# Patient Record
Sex: MALE | Race: BLACK OR AFRICAN AMERICAN | NOT HISPANIC OR LATINO | Employment: OTHER | URBAN - METROPOLITAN AREA
[De-identification: names, ages, dates, MRNs, and addresses within clinical notes are randomized per-mention and may not be internally consistent; named-entity substitution may affect disease eponyms.]

---

## 2017-10-09 ENCOUNTER — ALLSCRIPTS OFFICE VISIT (OUTPATIENT)
Dept: OTHER | Facility: OTHER | Age: 57
End: 2017-10-09

## 2017-10-28 NOTE — PROGRESS NOTES
Chief Complaint  CC: Patient here for dark patches on stomach area  History of Present Illness  70-year-old male presents secondary to concerns regarding pigmented lesions noted on his lower abdomen that his primary care physician told him to have check  Patient not sure how long lesions present  Patient also reports a history of dyshidrotic eczema which he developed large blisters on both his hands and feet different times  Patient never was given a topical treatment for this except over-the-counter preparations      Assessment  Assessed    1  Multiple benign nevi without atypia (216 9) (D22 9)   2  Screening for skin condition (V82 0) (Z13 89)   3  Eczematous dermatitis (692 9) (L30 9)    Past Medical History  Problems    1  History of malignant neoplasm of prostate (V10 46) (Z85 46)    The past medical history was reviewed  Surgical History  Problems    1  History of Oral Surgery Tooth Extraction    Surgical History reviewed      Family History  Mother    1  Family history of diabetes mellitus (V18 0) (Z83 3)   2  Family history of hypertension (V17 49) (Z82 49)  Father    3  Family history of diabetes mellitus (V18 0) (Z83 3)   4  Family history of malignant neoplasm of prostate (V16 42) (Z80 42)   5  Family history of Parkinsonism (V17 2) (Z82 0)  Family History Reviewed- Derm:   Family History was reviewed      Social History  Problems    · Social alcohol use (Z78 9)   · Tobacco non-user (V49 89) (Z78 9)  The social history was reviewed      Current Meds   1  Lidocaine 5 % External Patch; Therapy: (Recorded:[id number]) to Recorded   2  Robaxin-750 750 MG Oral Tablet; Therapy: (Recorded:09Oct2017) to Recorded    Review of Systems   Constitutional: Denies constitutional symptoms  Eyes: Denies eye symptoms  ENT:  denies ear symptoms, nasal symptoms, mouth or throat symptoms  Cardiovascular: Denies cardiovascular symptoms  Respiratory: Denies respiratory symptoms    Gastrointestinal: Denies gastrointestinal symptoms  Musculoskeletal: Denies musculoskeletal symptoms  Integumentary: Denies symptoms other than stated above  Neurological: Denies neurologic symptoms  Psychiatric: Denies psychiatric symptoms  Endocrine: Denies endocrine symptoms  Hematologic/Lymphatic: Denies hematologic symptoms  Allergies  Medication    1  No Known Drug Allergies  Non-Medication    2  No Known Environmental Allergies   3  No Known Food Allergies    Physical Exam   Constitutional  General appearance: Appears healthy and well developed  Lymphatic  No visible disturbance  Musculoskeletal  Digits and nails: No clubbing, cyanosis or edema  Cutaneous and nail exam normal    Skin  Scalp skin texture and hair distribution: Normal skin texture on scalp, normal hair distribution  Head: Normal turgor, no rashes, no lesions  Neck: Normal turgor, no rashes, no lesions  Chest: Normal turgor, no rashes, no lesions  Abdomen: Normal turgor, no rashes, no lesions  Back: Normal turgor, no rashes, no lesions  Right upper extremity: Normal turgor, no rashes, no lesions  Left upper extremity: Normal turgor, no rashes, no lesions  Right lower extremity: Normal turgor, no rashes, no lesions  Left lower extremity: Normal turgor, no rashes, no lesions  Neuro/Psych  Alert and oriented x 3  Displays comfort and cooperation during encounterl  Affect is normal    Finding Erythema slight scaling noted on the feet nothing much noted on the hands at this time pigmented macules all treatment regular shape and color on the abdomen nothing markedly atypical nevus start skin individual       Plan  Eczematous dermatitis    · Fluocinonide 0 05 % External Cream; APPLY SPARINGLY TO AFFECTED AREA(S)TWICE DAILY   · Follow-up PRN Evaluation and Treatment  Follow-up  Status: Complete  Done:09Oct2017    Discussion/Summary   Assessment #1: Nevi    Care Plan:  Reviewed the concept of ABCD and maranda alexander patient advised that patient patients with dark skin is a unusual phenomenon to develop any type of melanoma except for areas where the skin is less pigmented such as on the palms and soles  Assessment #2: Hand eczema  Care Plan: To me I if this is a contact related process we'll go ahead and treat with topical steroids follow-up if Flaring noted and consider patch testing if this process continues  Assessment #3: Screening for dermatologic disorders  Care Plan:  Nothing else of concern noted on exam follow-up as needed        Signatures   Electronically signed by : HAYDEN Mirza ; Oct  9 2017 11:32AM EST                       (Author)

## 2018-01-02 ENCOUNTER — GENERIC CONVERSION - ENCOUNTER (OUTPATIENT)
Dept: OTHER | Facility: OTHER | Age: 58
End: 2018-01-02

## 2019-06-25 ENCOUNTER — TELEPHONE (OUTPATIENT)
Dept: UROLOGY | Facility: AMBULATORY SURGERY CENTER | Age: 59
End: 2019-06-25

## 2019-06-25 NOTE — TELEPHONE ENCOUNTER
[facility] which states the patient has a history of prostate cancer and would need a repeat Biopsy and MRI  The patient is a new patient  Called patient and left a message to call office back to schedule and to find out which location he wants to go to

## 2019-08-02 NOTE — TELEPHONE ENCOUNTER
2nd attempt made to patient to call our office back to schedule  Letter sent   Records sent to central fax

## 2019-08-30 ENCOUNTER — TELEPHONE (OUTPATIENT)
Dept: UROLOGY | Facility: MEDICAL CENTER | Age: 59
End: 2019-08-30

## 2019-08-30 NOTE — TELEPHONE ENCOUNTER
Patient of Dr Jair Gutierrez seen in [facility]  Calling to cancel biopsy on 9/3 due to a family emergency  Next available spot I could find was in November  Should he be scheduled sooner?     He is on a plane and will be calling back once he lands

## 2019-08-30 NOTE — TELEPHONE ENCOUNTER
Patient can be seen for new patient appointment with and [facility] and they can evaluate to see if he needs a prostate biopsy and if so that can be scheduled with Dr Charles Adame has available appointments on 9/13/19

## 2019-08-30 NOTE — TELEPHONE ENCOUNTER
Going over the records I am finding that this patient was never scheduled for a biopsy, it was for a new patient appointment  This was a referral from the [facility] [address] [facility] for a jump in PSA 4 43 in August 2018 to 5 86 in December 2018  There is a consultation report from [facility] [place] [address] where patient was diagnosed with prostate cancer  They are requesting patient follows up closer to home for possible MRI/prostate biopsy  I cannot find another new patient appointment with Dr Madiha Sanchez in [facility] until 11/26/19  Should patient be seen sooner than that? If so please help to find sooner appointment  Thank you

## 2019-09-09 NOTE — TELEPHONE ENCOUNTER
PT is scheduled for 10/18/19 with Dr Yesica Peres              Notes: Rising PSA   Rescheduled:   9/9/2019 10:28 AM   By:   Farzaneh Taylor

## 2019-10-24 ENCOUNTER — OFFICE VISIT (OUTPATIENT)
Dept: DERMATOLOGY | Facility: CLINIC | Age: 59
End: 2019-10-24
Payer: COMMERCIAL

## 2019-10-24 DIAGNOSIS — L72.11 PILAR CYST: Primary | ICD-10-CM

## 2019-10-24 DIAGNOSIS — Z13.89 SCREENING FOR SKIN CONDITION: ICD-10-CM

## 2019-10-24 DIAGNOSIS — L20.84 INTRINSIC ATOPIC DERMATITIS: ICD-10-CM

## 2019-10-24 PROCEDURE — 99213 OFFICE O/P EST LOW 20 MIN: CPT | Performed by: DERMATOLOGY

## 2019-10-24 RX ORDER — IBUPROFEN 200 MG
TABLET ORAL EVERY 6 HOURS PRN
COMMUNITY
End: 2020-02-21 | Stop reason: HOSPADM

## 2019-10-24 NOTE — PATIENT INSTRUCTIONS
Pilar cyst advised patient that this is from hair follicles that ballooned out and forms this type of growth  No treatment indicated unless patient so desires or if lesion enlarges or changes    Hand dermatitis will refill his the topical steroid we had given to him previously  Screening for Dermatologic Disorders: Nothing else of concern noted on complete exam follow up in 1 year

## 2019-10-24 NOTE — PROGRESS NOTES
[facility] DERMATOLOGY  [address] [place] [address]  [id number]  [phone]     MRN: [id number] : 1960  Encounter: [id number]  Patient Information: Letha Willis  Chief complaint:  Cyst on scalp    History of present illness:  82-year-old male who had not seen for several years patient with a history of hand dermatitis has intermittently used different creams on at not active at this point but needs refill in addition patient also has a cyst present on the scalp that has been recently inflamed now settle down to being smaller than was previously but still present no other concerns noted  No past medical history on file  No past surgical history on file  Social History   Social History     Substance and Sexual Activity   Alcohol Use Not on file     Social History     Substance and Sexual Activity   Drug Use Not on file     Social History     Tobacco Use   Smoking Status Never Smoker   Smokeless Tobacco Never Used     No family history on file  Meds/Allergies   No Known Allergies    Meds:  Prior to Admission medications    Medication Sig Start Date End Date Taking?  Authorizing Provider   ibuprofen (MOTRIN) 200 mg tablet Take by mouth every 6 (six) hours as needed for mild pain   Yes Historical Provider, MD       Subjective:     Review of Systems:    General: negative for - chills, fatigue, fever,  weight gain or weight loss  Psychological: negative for - anxiety, behavioral disorder, concentration difficulties, decreased libido, depression, irritability, memory difficulties, mood swings, sleep disturbances or suicidal ideation  ENT: negative for - hearing difficulties , nasal congestion, nasal discharge, oral lesions, sinus pain, sneezing, sore throat  Allergy and Immunology: negative for - hives, insect bite sensitivity,  Hematological and Lymphatic: negative for - bleeding problems, blood clots,bruising, swollen lymph nodes  Endocrine: negative for - hair pattern changes, hot flashes, malaise/lethargy, mood swings, palpitations, polydipsia/polyuria, skin changes, temperature intolerance or unexpected weight change  Respiratory: negative for - cough, hemoptysis, orthopnea, shortness of breath, or wheezing  Cardiovascular: negative for - chest pain, dyspnea on exertion, edema,  Gastrointestinal: negative for - abdominal pain, nausea/vomiting  Genito-Urinary: negative for - dysuria, incontinence, irregular/heavy menses or urinary frequency/urgency  Musculoskeletal: negative for - gait disturbance, joint pain, joint stiffness, joint swelling, muscle pain, muscular weakness  Dermatological:  As in HPI  Neurological: negative for confusion, dizziness, headaches, impaired coordination/balance, memory loss, numbness/tingling, seizures, speech problems, tremors or weakness       Objective: There were no vitals taken for this visit  Physical Exam:    General Appearance:    Alert, cooperative, no distress   Head:    Normocephalic, without obvious abnormality, atraumatic           Skin:   A full skin exam was performed including scalp, head scalp, eyes, ears, nose, lips, neck, chest, axilla, abdomen, back, buttocks, bilateral upper extremities, bilateral lower extremities, hands, feet, fingers, toes, fingernails, and toenails cystic nodule noted on the posterior scalp measures about 15 mm size a no active hand dermatitis at this point nothing else of concern noted     Assessment:     1  Pilar cyst     2  Screening for skin condition     3  Intrinsic atopic dermatitis           Plan:   Pilar cyst advised patient that this is from hair follicles that ballooned out and forms this type of growth  No treatment indicated unless patient so desires or if lesion enlarges or changes    Hand dermatitis will refill his the topical steroid we had given to him previously  Screening for Dermatologic Disorders: Nothing else of concern noted on complete exam follow up in 1 year Roxanne Forbes MD  10/24/2019,10:13 AM    Portions of the record may have been created with voice recognition software   Occasional wrong word or "sound a like" substitutions may have occurred due to the inherent limitations of voice recognition software   Read the chart carefully and recognize, using context, where substitutions have occurred

## 2019-10-31 ENCOUNTER — OFFICE VISIT (OUTPATIENT)
Dept: UROLOGY | Facility: CLINIC | Age: 59
End: 2019-10-31
Payer: COMMERCIAL

## 2019-10-31 VITALS
HEIGHT: 71 IN | WEIGHT: 206.02 LBS | BODY MASS INDEX: 28.84 KG/M2 | DIASTOLIC BLOOD PRESSURE: 84 MMHG | HEART RATE: 58 BPM | SYSTOLIC BLOOD PRESSURE: 100 MMHG

## 2019-10-31 DIAGNOSIS — C61 PROSTATE CANCER (HCC): ICD-10-CM

## 2019-10-31 DIAGNOSIS — Z80.42 FAMILY HISTORY OF PROSTATE CANCER IN FATHER: ICD-10-CM

## 2019-10-31 DIAGNOSIS — R35.1 NOCTURIA: Primary | ICD-10-CM

## 2019-10-31 DIAGNOSIS — N35.919 STRICTURE OF MALE URETHRA, UNSPECIFIED STRICTURE TYPE: ICD-10-CM

## 2019-10-31 LAB
SL AMB  POCT GLUCOSE, UA: NORMAL
SL AMB LEUKOCYTE ESTERASE,UA: NORMAL
SL AMB POCT BLOOD,UA: NORMAL
SL AMB POCT CLARITY,UA: CLEAR
SL AMB POCT COLOR,UA: YELLOW
SL AMB POCT KETONES,UA: NORMAL
SL AMB POCT NITRITE,UA: NORMAL
SL AMB POCT PH,UA: 5
SL AMB POCT SPECIFIC GRAVITY,UA: 1
SL AMB POCT URINE PROTEIN: NORMAL

## 2019-10-31 PROCEDURE — 81002 URINALYSIS NONAUTO W/O SCOPE: CPT | Performed by: UROLOGY

## 2019-10-31 PROCEDURE — 99204 OFFICE O/P NEW MOD 45 MIN: CPT | Performed by: UROLOGY

## 2019-10-31 NOTE — PROGRESS NOTES
UROLOGY NEW CONSULT NOTE     CHIEF COMPLAINT   Noemí Herrera is a 64 y o  male with a complaint of   Chief Complaint   Patient presents with    Elevated PSA       History of Present Illness:     64 y o  male with a family history of prostate cancer in his father and grandfather  Patient a mildly elevated PSA in 2015 and underwent a prostate biopsy through the [facility]  This demonstrated 2 cores Vinicius 6 prostate cancer in the left side of the prostate  Patient reports he underwent a repeat biopsy in 2016 as part of his surveillance protocol  I do not have pathologic results of this  There is an office note the raise the question of progression with a [place] 3+4=7 prostate cancer  Patient was then kept on active surveillance  We have noted some rising his PSA from 3 2 in May of 2000 14-4 06 in May of 2015 to now 4 46  He presents today as a transfer of care to discuss next steps in his surveillance protocol  He has noted some itching and irritation with urine and spraying of his urinary stream     5/30/19 PSA 4 46 %Free 13 45    Past Medical History:   History reviewed  No pertinent past medical history  PAST SURGICAL HISTORY:   History reviewed  No pertinent surgical history  CURRENT MEDICATIONS:     Current Outpatient Medications   Medication Sig Dispense Refill    fluocinonide-emollient (LIDEX-E) 0 05 % cream Apply topically 2 (two) times a day 30 g 3    ibuprofen (MOTRIN) 200 mg tablet Take by mouth every 6 (six) hours as needed for mild pain       No current facility-administered medications for this visit          ALLERGIES:   No Known Allergies    SOCIAL HISTORY:     Social History     Socioeconomic History    Marital status: /Civil Union     Spouse name: None    Number of children: None    Years of education: None    Highest education level: None   Occupational History    None   Social Needs    Financial resource strain: None    Food insecurity:     Worry: None     Inability: None    Transportation needs:     Medical: None     Non-medical: None   Tobacco Use    Smoking status: Never Smoker    Smokeless tobacco: Never Used   Substance and Sexual Activity    Alcohol use: Never     Frequency: Never    Drug use: Never    Sexual activity: None   Lifestyle    Physical activity:     Days per week: None     Minutes per session: None    Stress: None   Relationships    Social connections:     Talks on phone: None     Gets together: None     Attends Alevism service: None     Active member of club or organization: None     Attends meetings of clubs or organizations: None     Relationship status: None    Intimate partner violence:     Fear of current or ex partner: None     Emotionally abused: None     Physically abused: None     Forced sexual activity: None   Other Topics Concern    None   Social History Narrative    None       SOCIAL HISTORY:     Family History   Problem Relation Age of Onset    Prostate cancer Father         2009    Diabetes Mother     Hypertension Mother        REVIEW OF SYSTEMS:     Review of Systems   Constitutional: Negative  Respiratory: Negative  Cardiovascular: Negative  Gastrointestinal: Negative  Genitourinary: Positive for dysuria, frequency and urgency  Musculoskeletal: Negative  Skin: Negative  Psychiatric/Behavioral: Negative  PHYSICAL EXAM:     /84   Pulse 58   Ht 5' 11" (1 803 m)   Wt 93 5 kg (206 lb 0 3 oz)   BMI 28 73 kg/m²     General:  Healthy appearing    male in no acute distress  They have a normal affect  There is not appear to be any gross neurologic defects or abnormalities  HEENT:  Normocephalic, atraumatic  Neck is supple without any palpable lymphadenopathy  Cardiovascular:  Patient has normal palpable distal radial pulses  There is no significant peripheral edema  No JVD is noted  Respiratory:  Patient has unlabored respirations    There is no audible wheeze or rhonchi  Abdomen:  Abdomen is  without surgical scars  Abdomen is soft and nontender  There is no tympany  Inguinal and umbilical hernia are not appreciated  Genitourinary:  Circumcised phallus, meatus is orthotopic but demonstrates some signs of meatal stenosis, testicles smooth and descended, prostate is mildly enlarged at 40 g no nodules or induration palpated    Musculoskeletal:  Patient does not have significant CVA tenderness in the  flank with palpation or percussion  They full range of motion in all 4 extremities  Strength in all 4 extremities appears congruent  Patient is able to ambulate without assistance or difficulty  Dermatologic:  Patient has no skin abnormalities or rashes  LABS:     CBC: No results found for: WBC, HGB, HCT, MCV, PLT    BMP: No results found for: GLUCOSE, CALCIUM, NA, K, CO2, CL, BUN, CREATININE    ASSESSMENT:     64 y o  male with [place] 6 prostate cancer in 2/12 cores diagnosed in 2015 with questionable description of potential progression of 3+4=7 prostate cancer without formal pathology available, managed with active surveillance    PLAN:     Given the office note that raises concern for progression of prostate cancer in 2016 as well as the patient's slightly rising PSA and the fact that he has year for of active surveillance, I have recommended that we move forward with multiparametric MRI of the prostate  If this is positive, the patient will be a great candidate for a fusion style biopsy  If the MRI is negative, would likely recommend transperineal biopsy in the operating room to reduce the risk of infection given the patient's prior biopsies  Patient understands and is very agreeable to this plan

## 2019-11-12 ENCOUNTER — TELEPHONE (OUTPATIENT)
Dept: UROLOGY | Facility: CLINIC | Age: 59
End: 2019-11-12

## 2019-11-12 NOTE — TELEPHONE ENCOUNTER
On bumped list - I called pt and left detailed voicemail that appt has been reschedule to 1/2/20 at 9am  If this date and time does not work , please call back call center to reschedule

## 2019-11-25 ENCOUNTER — TELEPHONE (OUTPATIENT)
Dept: UROLOGY | Facility: AMBULATORY SURGERY CENTER | Age: 59
End: 2019-11-25

## 2019-11-25 NOTE — TELEPHONE ENCOUNTER
This patient is scheduled for an MRI of the prostate on 12/3/2019 and the [facility] referral we have on file is   Please obtain new one his follow up is after the imaging as well    Thanks  Fabricio Bonner

## 2019-12-11 NOTE — TELEPHONE ENCOUNTER
Patient called in regard to need to cancel the MRI fusion Biopsy  Reported a family emergency  Please be advise    He can be reached at [phone] to reschedule  Thank you

## 2019-12-12 NOTE — TELEPHONE ENCOUNTER
Called and left message for patient that he needs to call central scheduling to cancel and reschedule MRI  Central scheduling phone number was left in the message

## 2020-01-07 ENCOUNTER — HOSPITAL ENCOUNTER (OUTPATIENT)
Dept: RADIOLOGY | Age: 60
Discharge: HOME | End: 2020-01-07
Payer: OTHER GOVERNMENT

## 2020-01-07 DIAGNOSIS — C61 PROSTATE CANCER (HCC): ICD-10-CM

## 2020-01-07 PROCEDURE — A9585 GADOBUTROL INJECTION: HCPCS | Performed by: UROLOGY

## 2020-01-07 PROCEDURE — 76377 3D RENDER W/INTRP POSTPROCES: CPT

## 2020-01-07 PROCEDURE — 72197 MRI PELVIS W/O & W/DYE: CPT

## 2020-01-07 RX ADMIN — GADOBUTROL 9 ML: 604.72 INJECTION INTRAVENOUS at 11:31

## 2020-01-09 ENCOUNTER — OFFICE VISIT (OUTPATIENT)
Dept: UROLOGY | Facility: CLINIC | Age: 60
End: 2020-01-09
Payer: COMMERCIAL

## 2020-01-09 VITALS
HEART RATE: 58 BPM | WEIGHT: 206 LBS | DIASTOLIC BLOOD PRESSURE: 84 MMHG | HEIGHT: 71 IN | BODY MASS INDEX: 28.84 KG/M2 | SYSTOLIC BLOOD PRESSURE: 116 MMHG

## 2020-01-09 DIAGNOSIS — C61 PROSTATE CANCER (HCC): Primary | ICD-10-CM

## 2020-01-09 PROCEDURE — 99214 OFFICE O/P EST MOD 30 MIN: CPT | Performed by: UROLOGY

## 2020-01-09 RX ORDER — MELOXICAM 15 MG/1
15 TABLET ORAL
COMMUNITY
Start: 2019-12-04 | End: 2020-02-21 | Stop reason: HOSPADM

## 2020-01-09 NOTE — PROGRESS NOTES
UROLOGY NEW CONSULT NOTE     CHIEF COMPLAINT   Waqar Carrillo is a 64 y o  male with a complaint of   Chief Complaint   Patient presents with    Prostate Cancer       History of Present Illness:     64 y o  male with a family history of prostate cancer in his father and grandfather  Patient a mildly elevated PSA in 2015 and underwent a prostate biopsy through the [employer]  This demonstrated 2 cores [place] 6 prostate cancer in the left side of the prostate  Patient reports he underwent a repeat biopsy in 2016 as part of his surveillance protocol  I do not have pathologic results of this  There is an office note the raise the question of progression with a Vinicius 3+4=7 prostate cancer  Patient was then kept on active surveillance  We have noted some rising his PSA from 3 2 in May of 2000 14-4 06 in May of 2015 to now 4 46  He presents today as a transfer of care to discuss next steps in his surveillance protocol  He has noted some itching and irritation with urine and spraying of his urinary stream     5/30/19 PSA 4 46 %Free 13 45      Patient returns after October visit  He underwent his multiparametric MRI  This shows a PI-RADS 1 scoring  Patient returns to discuss findings  Unfortunately, his brother passed away in December  Unclear underlying condition  No direct prostate cancer diagnosis in him  AUA SS 16 QoL 1, HELENA 24    Past Medical History:   History reviewed  No pertinent past medical history  PAST SURGICAL HISTORY:   History reviewed  No pertinent surgical history  CURRENT MEDICATIONS:     Current Outpatient Medications   Medication Sig Dispense Refill    fluocinonide-emollient (LIDEX-E) 0 05 % cream Apply topically 2 (two) times a day 30 g 3    ibuprofen (MOTRIN) 200 mg tablet Take by mouth every 6 (six) hours as needed for mild pain      meloxicam (MOBIC) 15 mg tablet        No current facility-administered medications for this visit          ALLERGIES:   No Known Allergies    SOCIAL HISTORY:     Social History     Socioeconomic History    Marital status: /Civil Union     Spouse name: None    Number of children: None    Years of education: None    Highest education level: None   Occupational History    None   Social Needs    Financial resource strain: None    Food insecurity:     Worry: None     Inability: None    Transportation needs:     Medical: None     Non-medical: None   Tobacco Use    Smoking status: Never Smoker    Smokeless tobacco: Never Used   Substance and Sexual Activity    Alcohol use: Never     Frequency: Never    Drug use: Never    Sexual activity: None   Lifestyle    Physical activity:     Days per week: None     Minutes per session: None    Stress: None   Relationships    Social connections:     Talks on phone: None     Gets together: None     Attends Yarsanism service: None     Active member of club or organization: None     Attends meetings of clubs or organizations: None     Relationship status: None    Intimate partner violence:     Fear of current or ex partner: None     Emotionally abused: None     Physically abused: None     Forced sexual activity: None   Other Topics Concern    None   Social History Narrative    None       SOCIAL HISTORY:     Family History   Problem Relation Age of Onset    Prostate cancer Father         2009    Diabetes Mother     Hypertension Mother        REVIEW OF SYSTEMS:     Review of Systems   Constitutional: Negative  Respiratory: Negative  Cardiovascular: Negative  Gastrointestinal: Negative  Genitourinary: Positive for dysuria, frequency and urgency  Musculoskeletal: Negative  Skin: Negative  Psychiatric/Behavioral: Negative            PHYSICAL EXAM:     /84 (BP Location: Left arm, Patient Position: Sitting, Cuff Size: Standard)   Pulse 58   Ht 5' 11" (1 803 m)   Wt 93 4 kg (206 lb)   BMI 28 73 kg/m²     General:  Healthy appearing    male in no acute distress  They have a normal affect  There is not appear to be any gross neurologic defects or abnormalities  HEENT:  Normocephalic, atraumatic  Neck is supple without any palpable lymphadenopathy  Cardiovascular:  Patient has normal palpable distal radial pulses  There is no significant peripheral edema  No JVD is noted  Respiratory:  Patient has unlabored respirations  There is no audible wheeze or rhonchi  Abdomen:  Abdomen is  without surgical scars  Abdomen is soft and nontender  There is no tympany  Inguinal and umbilical hernia are not appreciated  Musculoskeletal:  Patient does not have significant CVA tenderness in the  flank with palpation or percussion  They full range of motion in all 4 extremities  Strength in all 4 extremities appears congruent  Patient is able to ambulate without assistance or difficulty  Dermatologic:  Patient has no skin abnormalities or rashes  LABS:     CBC: No results found for: WBC, HGB, HCT, MCV, PLT    BMP: No results found for: GLUCOSE, CALCIUM, NA, K, CO2, CL, BUN, CREATININE      IMAGIN/7/20  MULTIPARAMETRIC MRI OF THE PROSTATE WITH AND WITHOUT CONTRAST-WITH 3-D POSTPROCESSING      INDICATION:   C61: Malignant neoplasm of prostate      Family history of prostate cancer in his father and grandfather  Patient a mildly elevated PSA in  and underwent a prostate biopsy through the [facility]  This demonstrated 2 cores [place] 6 prostate cancer in the left side of the prostate  Patient   reports he underwent a repeat biopsy in 2016 as part of his surveillance protocol  Pathologic results are not available  Patient was then kept on active surveillance  We have noted some rising his PSA from 3 2 in May of 2014 to 4 06 in May of 2015 to   4 46 in May 2019        COMPARISON: None      PSA LEVEL: 4 46 ng/ml  2019   PRIOR BIOPSY DATE: As above    BIOPSY RESULTS: As above      TECHNIQUE: The following pulse sequences were obtained:  Small field-of-view axial T1-weighted and multiplanar T2-weighted images; DWI axial and ADC map; large field of view axial T2 weighted images; T1w in-phase and opposed-phase axials of entire pelvis   and dynamic 3D T1w axial before and during IV contrast injection         CONTRAST:  Gadobutrol (Gadavist) 9 mL of gadobutrol injection (MULTI-DOSE) ml      TECHNICAL LIMITATIONS: None         FINDINGS:     PROSTATE:     Size (AP x TRV x CC): 3 7 x 4 6 x 4 1 cm = 37 mL  Post-biopsy hemorrhage:  None  Central gland enlargement (BPH): Mild      Focal lesions - No dominant lesion  Findings of BPH with round, completely encapsulated nodule (s)  PI-RADSv2 1 Category 1 -  Very low (clinically significant cancer highly unlikely)      Seminal vesicle invasion: Not present      URINARY BLADDER: Unremarkable      LYMPH NODES: No pelvic lymphadenopathy      BONES: No suspicious osseous lesion            Note: Clinically significant cancer is defined on pathology/histology as Vinicius score greater than or equal to 7, and/or volume of greater than or equal to 0 5 mL, and/or extraprostatic extension      IMPRESSION:     1  PI-RADSv2 1 Category 1 - Very low (clinically significant cancer is highly unlikely to be present)  Mild BPH      2  No extraprostatic tumor, seminal vesicle invasion, pelvic lymphadenopathy, or pelvic osseous metastatic disease      3  Calculated prostate volume of 37 mL      Prostate gland boundaries were segmented using 3D advanced post-processing on an independent iAmplify system workstation with active physician participation  ASSESSMENT:     64 y o  male with Vinicius 6 prostate cancer in 2/12 cores diagnosed in 2015 with questionable description of potential progression of 3+4=7 prostate cancer without formal pathology available, managed with active surveillance    PLAN:     At this point time, patient has no findings of lesion on the multiparametric MRI    I do feel strongly he will require and updated biopsy especially given the question of [place] 7 cancer  I have recommended that we approached in a transperineal approach  This will be done under anesthesia in the operating room  Procedure risks and benefits discussed with the patient today and he is very motivated to move forward  Transperineal prostate biopsy will be scheduled

## 2020-01-20 ENCOUNTER — PROCEDURE VISIT (OUTPATIENT)
Dept: DERMATOLOGY | Facility: CLINIC | Age: 60
End: 2020-01-20
Payer: COMMERCIAL

## 2020-01-20 DIAGNOSIS — L72.11 PILAR CYST: Primary | ICD-10-CM

## 2020-01-20 PROCEDURE — 99212 OFFICE O/P EST SF 10 MIN: CPT | Performed by: DERMATOLOGY

## 2020-01-20 NOTE — PATIENT INSTRUCTIONS
Since lesion has pretty much disappeared this time would not go ahead and advise further at this point unless it appears to be recurring

## 2020-01-20 NOTE — PROGRESS NOTES
[facility] DERMATOLOGY  [address] [place] [id number]  [phone]  [phone]     MRN: [id number] : 1960  Encounter: [id number]  Patient Information: Eartha Jeans    Subjective:     70-year-old male who rescheduled for excision of Pilar cyst on the scalp notes that the lesion became somewhat more inflamed was this charge some material that he squeezed the area and emptied out most of the lesion     Objective: There were no vitals taken for this visit  Physical Exam:    General Appearance:    Alert, cooperative, no distress   Skin:   No distinct cyst noted at this time     Assessment:     1  Pilar cyst           Plan:   Since lesion has pretty much disappeared this time would not go ahead and advise further at this point unless it appears to be recurring      Prior to Admission medications    Medication Sig Start Date End Date Taking? Authorizing Provider   fluocinonide-emollient (LIDEX-E) 0 05 % cream Apply topically 2 (two) times a day 10/24/19  Yes Claudette Shade, MD   ibuprofen (MOTRIN) 200 mg tablet Take by mouth every 6 (six) hours as needed for mild pain   Yes Historical Provider, MD   meloxicam (MOBIC) 15 mg tablet  19  Yes Historical Provider, MD     No Known Allergies    Claudette Shade, MD  2020,2:13 PM    Portions of the record may have been created with voice recognition software   Occasional wrong word or "sound a like" substitutions may have occurred due to the inherent limitations of voice recognition software   Read the chart carefully and recognize, using context, where substitutions have occurred

## 2020-01-21 NOTE — PRE-PROCEDURE INSTRUCTIONS
Pre-Surgery Instructions:   Medication Instructions    fluocinonide-emollient (LIDEX-E) 0 05 % cream Instructed patient per Anesthesia Guidelines   ibuprofen (MOTRIN) 200 mg tablet Instructed patient per Anesthesia Guidelines   meloxicam (MOBIC) 15 mg tablet Instructed patient per Anesthesia Guidelines      Preop,medications and showering instructions using an antibacterial soap reviewed - Instructed to follow DR Doan's bowel prep instructions

## 2020-01-23 ENCOUNTER — TELEPHONE (OUTPATIENT)
Dept: UROLOGY | Facility: AMBULATORY SURGERY CENTER | Age: 60
End: 2020-01-23

## 2020-01-23 NOTE — TELEPHONE ENCOUNTER
Faxed records to [facility] (fax# [phone])  Patient is having a procedure on 2-21-20 and his records from that procedure will be faxed to [facility] at that time as well

## 2020-02-10 ENCOUNTER — ANESTHESIA EVENT (OUTPATIENT)
Dept: PERIOP | Facility: AMBULARY SURGERY CENTER | Age: 60
End: 2020-02-10
Payer: OTHER GOVERNMENT

## 2020-02-20 ENCOUNTER — TELEPHONE (OUTPATIENT)
Dept: UROLOGY | Facility: MEDICAL CENTER | Age: 60
End: 2020-02-20

## 2020-02-20 NOTE — TELEPHONE ENCOUNTER
I sent patient an email that the hospital will call between 3-6 this afternoon with his arrival time

## 2020-02-20 NOTE — TELEPHONE ENCOUNTER
This is a patient of Dr Carmen Rodgers in [facility]     Patient has a question about surgery time for tomorrow and location  I did transfer him to your [facility]

## 2020-02-21 ENCOUNTER — HOSPITAL ENCOUNTER (OUTPATIENT)
Facility: AMBULARY SURGERY CENTER | Age: 60
Setting detail: OUTPATIENT SURGERY
Discharge: HOME | End: 2020-02-21
Attending: UROLOGY | Admitting: UROLOGY
Payer: OTHER GOVERNMENT

## 2020-02-21 ENCOUNTER — ANESTHESIA (OUTPATIENT)
Dept: PERIOP | Facility: AMBULARY SURGERY CENTER | Age: 60
End: 2020-02-21
Payer: OTHER GOVERNMENT

## 2020-02-21 VITALS
SYSTOLIC BLOOD PRESSURE: 100 MMHG | WEIGHT: 208 LBS | RESPIRATION RATE: 18 BRPM | OXYGEN SATURATION: 100 % | HEART RATE: 53 BPM | TEMPERATURE: 97.5 F | DIASTOLIC BLOOD PRESSURE: 84 MMHG | BODY MASS INDEX: 29.12 KG/M2 | HEIGHT: 71 IN

## 2020-02-21 DIAGNOSIS — Z80.42 FAMILY HISTORY OF PROSTATE CANCER IN FATHER: Primary | ICD-10-CM

## 2020-02-21 DIAGNOSIS — C61 PROSTATE CANCER (HCC): ICD-10-CM

## 2020-02-21 PROCEDURE — NC001 PR NO CHARGE: Performed by: UROLOGY

## 2020-02-21 PROCEDURE — 88344 IMHCHEM/IMCYTCHM EA MLT ANTB: CPT | Performed by: PATHOLOGY

## 2020-02-21 PROCEDURE — G0416 PROSTATE BIOPSY, ANY MTHD: HCPCS | Performed by: PATHOLOGY

## 2020-02-21 PROCEDURE — 55700 PR BIOPSY OF PROSTATE,NEEDLE/PUNCH: CPT | Performed by: UROLOGY

## 2020-02-21 RX ORDER — PHENAZOPYRIDINE HYDROCHLORIDE 100 MG/1
100 TABLET, FILM COATED ORAL ONCE
Status: COMPLETED | OUTPATIENT
Start: 2020-02-21 | End: 2020-02-21

## 2020-02-21 RX ORDER — FENTANYL CITRATE 50 UG/ML
INJECTION, SOLUTION INTRAMUSCULAR; INTRAVENOUS AS NEEDED
Status: DISCONTINUED | OUTPATIENT
Start: 2020-02-21 | End: 2020-02-21 | Stop reason: SURG

## 2020-02-21 RX ORDER — LIDOCAINE HYDROCHLORIDE 10 MG/ML
INJECTION, SOLUTION EPIDURAL; INFILTRATION; INTRACAUDAL; PERINEURAL AS NEEDED
Status: DISCONTINUED | OUTPATIENT
Start: 2020-02-21 | End: 2020-02-21 | Stop reason: SURG

## 2020-02-21 RX ORDER — DEXAMETHASONE SODIUM PHOSPHATE 10 MG/ML
INJECTION, SOLUTION INTRAMUSCULAR; INTRAVENOUS AS NEEDED
Status: DISCONTINUED | OUTPATIENT
Start: 2020-02-21 | End: 2020-02-21 | Stop reason: SURG

## 2020-02-21 RX ORDER — LIDOCAINE HYDROCHLORIDE AND EPINEPHRINE 10; 10 MG/ML; UG/ML
INJECTION, SOLUTION INFILTRATION; PERINEURAL AS NEEDED
Status: DISCONTINUED | OUTPATIENT
Start: 2020-02-21 | End: 2020-02-21 | Stop reason: HOSPADM

## 2020-02-21 RX ORDER — CEFTRIAXONE 1 G/1
1000 INJECTION, POWDER, FOR SOLUTION INTRAMUSCULAR; INTRAVENOUS ONCE
Status: COMPLETED | OUTPATIENT
Start: 2020-02-21 | End: 2020-02-21

## 2020-02-21 RX ORDER — PROPOFOL 10 MG/ML
INJECTION, EMULSION INTRAVENOUS AS NEEDED
Status: DISCONTINUED | OUTPATIENT
Start: 2020-02-21 | End: 2020-02-21 | Stop reason: SURG

## 2020-02-21 RX ORDER — TRAMADOL HYDROCHLORIDE 50 MG/1
50 TABLET ORAL EVERY 6 HOURS PRN
Qty: 5 TABLET | Refills: 0 | Status: SHIPPED | OUTPATIENT
Start: 2020-02-21

## 2020-02-21 RX ORDER — FENTANYL CITRATE/PF 50 MCG/ML
25 SYRINGE (ML) INJECTION
Status: DISCONTINUED | OUTPATIENT
Start: 2020-02-21 | End: 2020-02-21 | Stop reason: HOSPADM

## 2020-02-21 RX ORDER — ONDANSETRON 2 MG/ML
INJECTION INTRAMUSCULAR; INTRAVENOUS AS NEEDED
Status: DISCONTINUED | OUTPATIENT
Start: 2020-02-21 | End: 2020-02-21 | Stop reason: SURG

## 2020-02-21 RX ORDER — TAMSULOSIN HYDROCHLORIDE 0.4 MG/1
0.4 CAPSULE ORAL ONCE
Status: COMPLETED | OUTPATIENT
Start: 2020-02-21 | End: 2020-02-21

## 2020-02-21 RX ORDER — ONDANSETRON 2 MG/ML
4 INJECTION INTRAMUSCULAR; INTRAVENOUS ONCE AS NEEDED
Status: DISCONTINUED | OUTPATIENT
Start: 2020-02-21 | End: 2020-02-21 | Stop reason: HOSPADM

## 2020-02-21 RX ORDER — HYDROMORPHONE HCL/PF 1 MG/ML
0.2 SYRINGE (ML) INJECTION
Status: DISCONTINUED | OUTPATIENT
Start: 2020-02-21 | End: 2020-02-21 | Stop reason: HOSPADM

## 2020-02-21 RX ORDER — SODIUM CHLORIDE, SODIUM LACTATE, POTASSIUM CHLORIDE, CALCIUM CHLORIDE 600; 310; 30; 20 MG/100ML; MG/100ML; MG/100ML; MG/100ML
INJECTION, SOLUTION INTRAVENOUS CONTINUOUS PRN
Status: DISCONTINUED | OUTPATIENT
Start: 2020-02-21 | End: 2020-02-21 | Stop reason: SURG

## 2020-02-21 RX ADMIN — DEXAMETHASONE SODIUM PHOSPHATE 4 MG: 10 INJECTION, SOLUTION INTRAMUSCULAR; INTRAVENOUS at 08:32

## 2020-02-21 RX ADMIN — FENTANYL CITRATE 50 MCG: 50 INJECTION, SOLUTION INTRAMUSCULAR; INTRAVENOUS at 08:32

## 2020-02-21 RX ADMIN — LIDOCAINE HYDROCHLORIDE 80 MG: 10 INJECTION, SOLUTION EPIDURAL; INFILTRATION; INTRACAUDAL; PERINEURAL at 08:27

## 2020-02-21 RX ADMIN — PHENAZOPYRIDINE 100 MG: 100 TABLET ORAL at 09:50

## 2020-02-21 RX ADMIN — PROPOFOL 150 MG: 10 INJECTION, EMULSION INTRAVENOUS at 08:27

## 2020-02-21 RX ADMIN — CEFTRIAXONE SODIUM 1000 MG: 1 INJECTION, POWDER, FOR SOLUTION INTRAMUSCULAR; INTRAVENOUS at 08:21

## 2020-02-21 RX ADMIN — TAMSULOSIN HYDROCHLORIDE 0.4 MG: 0.4 CAPSULE ORAL at 09:50

## 2020-02-21 RX ADMIN — SODIUM CHLORIDE, SODIUM LACTATE, POTASSIUM CHLORIDE, AND CALCIUM CHLORIDE: .6; .31; .03; .02 INJECTION, SOLUTION INTRAVENOUS at 07:58

## 2020-02-21 RX ADMIN — ONDANSETRON 4 MG: 2 INJECTION INTRAMUSCULAR; INTRAVENOUS at 08:32

## 2020-02-21 NOTE — OP NOTE
OPERATIVE REPORT  PATIENT NAME: Deborah Meyer    :  1960  MRN: [id number]  Pt Location: [facility] OR ROOM 04    SURGERY DATE: 2020    Surgeon(s) and Role:     * Latisha Anderson MD - Primary    Preop Diagnosis:  Prostate cancer ([address] ) [place]    Post-Op Diagnosis Codes:     * Prostate cancer ([address] ) [place]    Procedure(s) (LRB):  TRANSRECTAL NEEDLE BIOPSY PROSTATE (TRNBP), *TRANSPERINEAL APPROACH (N/A)    Specimen(s):  ID Type Source Tests Collected by Time Destination   1 : left anterior lateral Tissue Prostate TISSUE EXAM Latisha Anderson MD 2020 4158    2 : left anterior medial Tissue Prostate TISSUE EXAM Latisha Anderson MD 2020 6409    3 : right anterior lateral Tissue Prostate TISSUE EXAM Latisha Anderson MD 2020 2840    4 : right anterior medial Tissue Prostate TISSUE EXAM Latisha Anderson MD 2020 4808    5 : left posterior medial Tissue Prostate TISSUE EXAM Latisha Anderson MD 2020 2907    6 : left posterior lateral Tissue Prostate TISSUE EXAM Latisha Anderson MD 2020 1772    7 : right posterior lateral Tissue Prostate TISSUE EXAM Latisha Anderson MD 2020 5319    8 : right posterior medial Tissue Prostate TISSUE EXAM Latisha Anderson MD 2020 0844    9 : right base Tissue Prostate TISSUE EXAM Latisha Anderson MD 2020 0848    10 : left base Tissue Prostate TISSUE EXAM Latisha Anderson MD 2020 0848        Estimated Blood Loss:   Minimal    Drains:  Urethral Catheter Latex 16 Fr  (Active)   Number of days: 0       Anesthesia Type:   Choice    Operative Indications:  Prostate cancer ([facility] [address] ) [C61]      Operative Findings:  1   Prostate tissue taken from Anterior Medial, Anterior Lateral, Posterior Lateral, Posterior Medial and Base on both the RIGHT and LEFT sides, ten geographies in total    Complications:   None    Procedure and Technique:  Deborah Meyer is a 64 y o  male with question of prior low risk prostate cancer history on surveillance  The patient was counseled regarding their options and ultimately opted to proceed with transperineal prostate biopsy  Risks and benefits of the procedure were described and the patient signed an informed consent  On 2/21/2020, the patient proceeded to the operating room  They were laid supine on the operating room table and a pre-procedure time out was performed with all parties present and in agreement of the procedure planned and laterality  Patient received intravenous antibiotics in the form of ceftriaxone and sequential compression devices were placed on bilateral lower extremities  They were then induced with a general LMA anesthetic  He was placed in dorsal lithotomy with care to pad all pressure points  His perineum and genitalia were prepped with a ChloraPrep solution  Sterile Iodoband was placed over the perineum above the rectum  Side fire biplanar transrectal ultrasound was performed and measurements of the prostate gland were taken as above  In the midportion of the left and right prostate, a susanna was denoted in the perineal skin  Skin wheal was elevated with 5 cc of 1% lidocaine plain on either side  The PrecisionPoint device was then introduced into the left perineal subcutaneous tissue  We visualized the tip of the needle  Spinal needle was introduced through the subcutaneous fat and into the pelvic floor muscle where a perineal pudendal block was performed with additional 5 cc of 1% lidocaine  This was repeated on the patient's right side  On the right side of the prostate, we performed serial biopsies of the right posterior medial peripheral zone, right posterior lateral peripheral zone and moving up the anterior horn to the right anterior lateral and right anteromedial jog Rapide  Separate specimen was taken from the right-sided prostate base  Several areas had multiple cores taken      The PrecisionPoint device was repositioned into the left perineal stab  The biopsies were undertaken in the same fashion on the left side  Left posterior medial, left posterior lateral, left anterior lateral, left anteromedial and left base  The transplant rectal ultrasound probe was removed  The eye band was retracted and there was some urethral bleeding  A 16 [place] silicone catheter was placed per urethra and the urine was noted to be clear  Some gentle pressure was placed on the perineum for approximately 5 minutes  At the completion of the procedure, the patient was taken out of dorsal lithotomy, extubated and transferred to PACU in good condition        I was present for the entire procedure    Patient Disposition:  PACU     SIGNATURE: Raven Gray MD  DATE: February 21, 2020  TIME: 9:08 AM

## 2020-02-21 NOTE — ANESTHESIA POSTPROCEDURE EVALUATION
Post-Op Assessment Note    CV Status:  Stable  Pain Score: 0    Pain management: adequate     Mental Status:  Sleepy   Hydration Status:  Euvolemic   PONV Controlled:  Controlled   Airway Patency:  Patent   Post Op Vitals Reviewed: Yes      Staff: CRNA   Comments: vss sv nonobstructed uneventful          BP   98/50   Temp      Pulse 66   Resp 18   SpO2 98

## 2020-02-21 NOTE — ANESTHESIA PREPROCEDURE EVALUATION
Review of Systems/Medical History  Patient summary reviewed  Chart reviewed  No history of anesthetic complications     Cardiovascular  Exercise tolerance (METS): >4,     Pulmonary  Pneumonia,        GI/Hepatic  Negative GI/hepatic ROS     Comment: Appropriately NPO       Negative  ROS        Endo/Other  Negative endo/other ROS      GYN  Negative gynecology ROS          Hematology  Negative hematology ROS      Musculoskeletal    Arthritis     Neurology  Negative neurology ROS      Psychology           Physical Exam    Airway    Mallampati score: II  TM Distance: >3 FB  Neck ROM: full     Dental   No notable dental hx     Cardiovascular      Pulmonary      Other Findings        Anesthesia Plan  ASA Score- 2     Anesthesia Type-   Additional Monitors:   Airway Plan: ETT  Plan Factors-    Induction- intravenous  Postoperative Plan- Plan for postoperative opioid use  Informed Consent- Anesthetic plan and risks discussed with patient  I personally reviewed this patient with the CRNA  Discussed and agreed on the Anesthesia Plan with the CRNA  Jose Lagunas

## 2020-02-21 NOTE — DISCHARGE INSTRUCTIONS
Prostate Biopsy   WHAT YOU NEED TO KNOW:   What do I need to know about a prostate biopsy? A prostate biopsy is a procedure to remove samples of tissue from your prostate gland  The prostate is a male sex gland that makes fluid found in semen  It is located just below the bladder  After the samples are removed, they are sent to a lab and tested for cancer  How do I prepare for a prostate biopsy? · Your healthcare provider will talk to you about how to prepare for this procedure  He may tell you not to eat or drink anything after midnight on the day of your surgery  You will need to stop taking blood thinners several days before your procedure  Examples of blood thinners include warfarin and NSAIDs  You may also need to stop taking herbal supplements before your procedure  Your healthcare provider will tell you what other medicines to take or not take on the day of your procedure  · You will be given an antibiotic to help prevent a bacterial infection  You may need to give yourself an enema (liquid medicine put in your rectum) to help empty your bowel before your procedure  What will happen during a prostate biopsy? · You may need to lie on your side with your knees pulled up toward your chest  Numbing cream or gel may be put into your rectum, or numbing medicine may be injected near your prostate  You may instead be given general anesthesia to keep you asleep and free from pain during the procedure  A biopsy sample may be taken through your rectum, urethra, or perineum  The perineum is the area between your scrotum and rectum  Most of the time, biopsy samples are taken through the rectum  · If your healthcare provider takes a sample through your rectum, he will insert a small ultrasound probe into your rectum  The ultrasound shows pictures of your prostate on a monitor, and is used to help guide the biopsy needle   Your healthcare provider will push the biopsy needle through the wall of your rectum and into your prostate gland  Your healthcare provider will remove between 6 to 12 samples of tissue from different areas of your prostate gland  The samples will be sent to a lab and tested for cancer  What will happen after a prostate biopsy? You may feel soreness at the biopsy site  You may need to take antibiotics for up to 2 days after your procedure to help prevent an infection  You may have some bleeding from your rectum  You may also have blood in your urine, bowel movements, or semen  What are the risks of a prostate biopsy? You may bleed more than expected or get an infection in your urinary tract or prostate gland  The infection may spread to your blood and the rest of your body  Your bladder may not empty completely when you urinate  You may need a catheter to help empty your bladder for a short period of time  Cancer cells may be missed during your biopsy procedure  You may need another prostate biopsy to check for cancer again  CARE AGREEMENT:   You have the right to help plan your care  Learn about your health condition and how it may be treated  Discuss treatment options with your caregivers to decide what care you want to receive  You always have the right to refuse treatment  The above information is an  only  It is not intended as medical advice for individual conditions or treatments  Talk to your doctor, nurse or pharmacist before following any medical regimen to see if it is safe and effective for you  © 2017 [employer] Information is for End User's use only and may not be sold, redistributed or otherwise used for commercial purposes  All illustrations and images included in CareNotes® are the copyrighted property of [employer]  or Bladimir Shi

## 2020-03-04 NOTE — PROGRESS NOTES
UROLOGY FOLLOWUP NOTE     CHIEF COMPLAINT   Samul Osgood is a 64 y o  male with a complaint of   Chief Complaint   Patient presents with    Prostate Cancer       History of Present Illness:     64 y o  male with a family history of prostate cancer in his father and grandfather  Patient a mildly elevated PSA in 2015 and underwent a prostate biopsy through the [employer]  This demonstrated 2 cores Dr[place] 6 prostate cancer in the left side of the prostate  Patient reports he underwent a repeat biopsy in 2016 as part of his surveillance protocol  I do not have pathologic results of this  There is an office note the raise the question of progression with a [place] 3+4=7 prostate cancer  Patient was then kept on active surveillance  We have noted some rising his PSA from 3 2 in May of 2000 14-4 06 in May of 2015 to now 4 46  He presents today as a transfer of care to discuss next steps in his surveillance protocol  He has noted some itching and irritation with urine and spraying of his urinary stream     5/30/19 PSA 4 46 %Free 13 45    Patient returns after October visit  He underwent his multiparametric MRI  This shows a PI-RADS 1 scoring  Patient returns to discuss findings  Unfortunately, his brother passed away in December  Unclear underlying condition  No direct prostate cancer diagnosis in him  AUA SS 16 QoL 1, HELENA 24    Underwent transperineal biopsy 2/21/20  Returns for pathology  Patient had some mild blood in his urine which is resolving  He reports that he and his wife, who lives in [place], are still considering getting pregnant with a child  This impacts his decision for treatment      Past Medical History:     Past Medical History:   Diagnosis Date    Arthritis     Cholecystolithiasis     Pneumonia        PAST SURGICAL HISTORY:     Past Surgical History:   Procedure Laterality Date    COLONOSCOPY      DE BIOPSY OF PROSTATE,NEEDLE/PUNCH N/A 2/21/2020    Procedure: TRANSRECTAL NEEDLE BIOPSY PROSTATE (TRNBP), *TRANSPERINEAL APPROACH;  Surgeon: Herbie Gomes MD;  [facility];  Service: Urology    WISDOM TOOTH EXTRACTION         CURRENT MEDICATIONS:     Current Outpatient Medications   Medication Sig Dispense Refill    fluocinonide-emollient (LIDEX-E) 0 05 % cream Apply topically 2 (two) times a day 30 g 3    traMADol (ULTRAM) 50 mg tablet Take 1 tablet (50 mg total) by mouth every 6 (six) hours as needed for moderate pain for up to 5 doses 5 tablet 0     No current facility-administered medications for this visit          ALLERGIES:   No Known Allergies    SOCIAL HISTORY:     Social History     Socioeconomic History    Marital status: /Civil Union     Spouse name: None    Number of children: None    Years of education: None    Highest education level: None   Occupational History    None   Social Needs    Financial resource strain: None    Food insecurity:     Worry: None     Inability: None    Transportation needs:     Medical: None     Non-medical: None   Tobacco Use    Smoking status: Never Smoker    Smokeless tobacco: Never Used   Substance and Sexual Activity    Alcohol use: Never     Frequency: Never    Drug use: Never    Sexual activity: None   Lifestyle    Physical activity:     Days per week: None     Minutes per session: None    Stress: None   Relationships    Social connections:     Talks on phone: None     Gets together: None     Attends Hindu service: None     Active member of club or organization: None     Attends meetings of clubs or organizations: None     Relationship status: None    Intimate partner violence:     Fear of current or ex partner: None     Emotionally abused: None     Physically abused: None     Forced sexual activity: None   Other Topics Concern    None   Social History Narrative    None       SOCIAL HISTORY:     Family History   Problem Relation Age of Onset    Prostate cancer Father         2009    Diabetes Mother     Hypertension Mother        REVIEW OF SYSTEMS:     Review of Systems   Constitutional: Negative  Respiratory: Negative  Cardiovascular: Negative  Gastrointestinal: Negative  Genitourinary: Positive for hematuria  Musculoskeletal: Negative  Skin: Negative  Psychiatric/Behavioral: Negative  PHYSICAL EXAM:     /98   Pulse 77   Ht 5' 11" (1 803 m)   Wt 95 3 kg (210 lb)   BMI 29 29 kg/m²     General:  Healthy appearing  male in no acute distress  They have a normal affect  There is not appear to be any gross neurologic defects or abnormalities  HEENT:  Normocephalic, atraumatic  Neck is supple without any palpable lymphadenopathy  Cardiovascular:  Patient has normal palpable distal radial pulses  There is no significant peripheral edema  No JVD is noted  Respiratory:  Patient has unlabored respirations  There is no audible wheeze or rhonchi  Abdomen:  Abdomen is  without surgical scars  Abdomen is soft and nontender  There is no tympany  Inguinal and umbilical hernia are not appreciated  Musculoskeletal:  Patient does not have significant CVA tenderness in the  flank with palpation or percussion  They full range of motion in all 4 extremities  Strength in all 4 extremities appears congruent  Patient is able to ambulate without assistance or difficulty  Dermatologic:  Patient has no skin abnormalities or rashes  LABS:     CBC: No results found for: WBC, HGB, HCT, MCV, PLT    BMP: No results found for: GLUCOSE, CALCIUM, NA, K, CO2, CL, BUN, CREATININE      IMAGIN/7/20  MULTIPARAMETRIC MRI OF THE PROSTATE WITH AND WITHOUT CONTRAST-WITH 3-D POSTPROCESSING      INDICATION:   C61: Malignant neoplasm of prostate      Family history of prostate cancer in his father and grandfather  Patient a mildly elevated PSA in  [facility]    This demonstrated 2 cores Vinicius 6 prostate cancer in the left side of the prostate  Patient   reports he underwent a repeat biopsy in 2016 as part of his surveillance protocol  Pathologic results are not available  Patient was then kept on active surveillance  We have noted some rising his PSA from 3 2 in May of 2014 to 4 06 in May of 2015 to   4 46 in May 2019        COMPARISON: None      PSA LEVEL: 4 46 ng/ml  5/2019   PRIOR BIOPSY DATE: As above  BIOPSY RESULTS: As above      TECHNIQUE: The following pulse sequences were obtained:  Small field-of-view axial T1-weighted and multiplanar T2-weighted images; DWI axial and ADC map; large field of view axial T2 weighted images; T1w in-phase and opposed-phase axials of entire pelvis   and dynamic 3D T1w axial before and during IV contrast injection         CONTRAST:  Gadobutrol (Gadavist) 9 mL of gadobutrol injection (MULTI-DOSE) ml      TECHNICAL LIMITATIONS: None         FINDINGS:     PROSTATE:     Size (AP x TRV x CC): 3 7 x 4 6 x 4 1 cm = 37 mL  Post-biopsy hemorrhage:  None  Central gland enlargement (BPH): Mild      Focal lesions - No dominant lesion  Findings of BPH with round, completely encapsulated nodule (s)  PI-RADSv2 1 Category 1 -  Very low (clinically significant cancer highly unlikely)      Seminal vesicle invasion: Not present      URINARY BLADDER: Unremarkable      LYMPH NODES: No pelvic lymphadenopathy      BONES: No suspicious osseous lesion            Note: Clinically significant cancer is defined on pathology/histology as Vinicius score greater than or equal to 7, and/or volume of greater than or equal to 0 5 mL, and/or extraprostatic extension      IMPRESSION:     1  PI-RADSv2 1 Category 1 - Very low (clinically significant cancer is highly unlikely to be present)  Mild BPH      2   No extraprostatic tumor, seminal vesicle invasion, pelvic lymphadenopathy, or pelvic osseous metastatic disease      3  Calculated prostate volume of 37 mL      Prostate gland boundaries were segmented using 3D advanced post-processing on an independent [employer] system workstation with active physician participation  PATHOLOGY:     2/21/20  Final Diagnosis    A  Prostate, left anterior lateral, biopsy:  - Focal high-grade prostatic intraepithelial neoplasia (HGPIN)      B  Prostate, left anterior medial, biopsy:  - Benign prostate glands and stroma      C  Prostate, right anterior lateral, biopsy:  - Benign prostate glands and stroma      D  Prostate, right anterior medial, biopsy:  - Prostatic adenocarcinoma, Vinicius grade 3 + 3 = 6/10 (Grade group 1)  Three small foci of cancer, measuring 2 0 mm in aggregate, involving one of two cores and 15% of the involved core, and spanning 50% of the core discontinuously  - Perineural invasion is absent      E  Prostate, left posterior medial, biopsy:  - Prostatic adenocarcinoma, Vinicius grade 3 + 3 = 6/10 (Grade group 1)  Two small foci of cancer, measuring 1 5 mm in aggregate, involving one of two cores and 15% of the involved core, and spanning 60% of the core discontinuously  - Perineural invasion is absent      F  Prostate, left posterior lateral, biopsy:  - Prostatic adenocarcinoma, [place] grade 3 + 3 = 6 (Grade group 1), involving one of one core and less than 5% of the tissue   - Perineural invasion is absent      G  Prostate, right posterior lateral, biopsy:  - Benign prostate glands and stroma      H  Prostate, right posterior medial, biopsy:  - Prostatic adenocarcinoma, [place] grade 3 + 3 = 6 (Grade group 1), involving one of two cores and 10% of the tissue   - Perineural invasion is absent      I  Prostate, right base, biopsy:  - Benign prostate glands and stroma      J  Prostate, left base, biopsy:  - Benign prostate glands and stroma       ASSESSMENT:     64 y o  male with Vinicius 6 prostate cancer in 2/12 cores diagnosed in 2015 with PI-RADS 1 mpMRI and Right Posterior Medial/Anterior Medial and Left Posterior Medial /Posterior Lateral [place] 3+3=6 on repeat transperineal biopsy     PLAN:       I reviewed the pathology with the patient  He has Vinicius 6 diffusely throughout the right and left side of the prostate  There was some right anterior fascicular zone cancer  Patient has not had up staging in 5 years of active surveillance and so it is hard for me to recommend a decision to move forward with more aggressive treatment  Patient also reports that he and his wife are considering  Having a child and so I think this certainly would push more towards a continued active surveillance protocol as opposed to surgery or radiation  I recommended the patient return to see me in 6 months for PSA and digital rectal exam and he is in agreement

## 2020-03-05 ENCOUNTER — OFFICE VISIT (OUTPATIENT)
Dept: UROLOGY | Facility: CLINIC | Age: 60
End: 2020-03-05
Payer: COMMERCIAL

## 2020-03-05 VITALS
BODY MASS INDEX: 29.4 KG/M2 | DIASTOLIC BLOOD PRESSURE: 98 MMHG | HEART RATE: 77 BPM | WEIGHT: 210 LBS | HEIGHT: 71 IN | SYSTOLIC BLOOD PRESSURE: 156 MMHG

## 2020-03-05 DIAGNOSIS — C61 PROSTATE CANCER (HCC): Primary | ICD-10-CM

## 2020-03-05 PROCEDURE — 99213 OFFICE O/P EST LOW 20 MIN: CPT | Performed by: UROLOGY

## 2020-03-17 ENCOUNTER — DOCUMENTATION (OUTPATIENT)
Dept: UROLOGY | Facility: AMBULATORY SURGERY CENTER | Age: 60
End: 2020-03-17

## 2020-03-17 NOTE — PROGRESS NOTES
Oncology Navigator Note: Multidisciplinary Urology Case Review 3/17/20  Physician Recommended Plan    Gill Navarrete is a 64 y o  male    Diagnosis: Prostate Cancer, [place] 6  Was diagnosed in 2015 and has been on active surveillance  Patient was discussed at the Multidisciplinary Urology Case review on  3/17/20  The group recommended active surveillance for his low risk Prostate Cancer  It is recommended that the patient have PSA and prostate exam every 3-6 months with a repeat prostate biopsy no later than one year

## 2020-09-15 ENCOUNTER — TELEPHONE (OUTPATIENT)
Dept: UROLOGY | Facility: MEDICAL CENTER | Age: 60
End: 2020-09-15

## 2020-09-15 NOTE — TELEPHONE ENCOUNTER
Patient called requesting to fax psa to [address] [facility]   [phone]  Faxed and confirmed

## 2020-09-16 NOTE — TELEPHONE ENCOUNTER
Patient called back to say he needs psa lab  to be refaxed to same number [phone]  refaxed and both time came back as no response  Left message to call office back to inform patient

## 2020-09-18 NOTE — TELEPHONE ENCOUNTER
Patient called stated [facility] has still not received fax  Refaxed and sent patient email

## 2020-09-18 NOTE — TELEPHONE ENCOUNTER
New Fax # [phone] attn: Dr Hemalatha Harmon fax# [phone]    Order faxed to both #'s listed above

## 2020-09-21 ENCOUNTER — APPOINTMENT (OUTPATIENT)
Dept: LAB | Facility: AMBULARY SURGERY CENTER | Age: 60
End: 2020-09-21
Attending: UROLOGY
Payer: COMMERCIAL

## 2020-09-21 DIAGNOSIS — C61 PROSTATE CANCER (HCC): ICD-10-CM

## 2020-09-21 LAB
ANION GAP SERPL CALCULATED.3IONS-SCNC: 6 MMOL/L (ref 4–13)
BUN SERPL-MCNC: 17 MG/DL (ref 5–25)
CALCIUM SERPL-MCNC: 9.3 MG/DL (ref 8.3–10.1)
CHLORIDE SERPL-SCNC: 109 MMOL/L (ref 100–108)
CO2 SERPL-SCNC: 25 MMOL/L (ref 21–32)
CREAT SERPL-MCNC: 1.15 MG/DL (ref 0.6–1.3)
GFR SERPL CREATININE-BSD FRML MDRD: 81 ML/MIN/1.73SQ M
GLUCOSE P FAST SERPL-MCNC: 90 MG/DL (ref 65–99)
POTASSIUM SERPL-SCNC: 3.9 MMOL/L (ref 3.5–5.3)
PSA SERPL-MCNC: 6.1 NG/ML (ref 0–4)
SODIUM SERPL-SCNC: 140 MMOL/L (ref 136–145)

## 2020-09-21 PROCEDURE — 84153 ASSAY OF PSA TOTAL: CPT

## 2020-09-21 PROCEDURE — 36415 COLL VENOUS BLD VENIPUNCTURE: CPT | Performed by: UROLOGY

## 2020-09-21 PROCEDURE — 80048 BASIC METABOLIC PNL TOTAL CA: CPT | Performed by: UROLOGY

## 2020-09-22 ENCOUNTER — OFFICE VISIT (OUTPATIENT)
Dept: UROLOGY | Facility: CLINIC | Age: 60
End: 2020-09-22
Payer: COMMERCIAL

## 2020-09-22 ENCOUNTER — TELEPHONE (OUTPATIENT)
Dept: UROLOGY | Facility: CLINIC | Age: 60
End: 2020-09-22

## 2020-09-22 VITALS
DIASTOLIC BLOOD PRESSURE: 70 MMHG | WEIGHT: 208.4 LBS | BODY MASS INDEX: 29.18 KG/M2 | SYSTOLIC BLOOD PRESSURE: 122 MMHG | HEIGHT: 71 IN | HEART RATE: 61 BPM | TEMPERATURE: 97.3 F

## 2020-09-22 DIAGNOSIS — G89.29 CHRONIC BILATERAL LOW BACK PAIN WITH SCIATICA, SCIATICA LATERALITY UNSPECIFIED: ICD-10-CM

## 2020-09-22 DIAGNOSIS — C61 PROSTATE CANCER (HCC): Primary | ICD-10-CM

## 2020-09-22 DIAGNOSIS — M54.40 CHRONIC BILATERAL LOW BACK PAIN WITH SCIATICA, SCIATICA LATERALITY UNSPECIFIED: ICD-10-CM

## 2020-09-22 PROCEDURE — 99213 OFFICE O/P EST LOW 20 MIN: CPT | Performed by: UROLOGY

## 2020-09-22 RX ORDER — MECLIZINE HCL 12.5 MG/1
TABLET ORAL
COMMUNITY
Start: 2020-02-06 | End: 2021-02-06

## 2020-09-22 NOTE — PROGRESS NOTES
UROLOGY FOLLOWUP NOTE     CHIEF COMPLAINT   Lesley Perry is a 64 y o  male with a complaint of   Chief Complaint   Patient presents with    Prostate Cancer     PSA 6 1 (09/21/20)       History of Present Illness:     64 y o  male with a family history of prostate cancer in his father and grandfather  Patient a mildly elevated PSA in 2015 and underwent a prostate biopsy through the [facility]  This demonstrated 2 cores Vinicius 6 prostate cancer in the left side of the prostate  Patient reports he underwent a repeat biopsy in 2016 as part of his surveillance protocol  I do not have pathologic results of this  There is an office note the raise the question of progression with a [place] 3+4=7 prostate cancer  Patient was then kept on active surveillance  We have noted some rising his PSA from 3 2 in May of 2000 14-4 06 in May of 2015 to now 4 46  He presents today as a transfer of care to discuss next steps in his surveillance protocol  He has noted some itching and irritation with urine and spraying of his urinary stream     5/30/19 PSA 4 46 %Free 13 45  Lab Results   Component Value Date    PSA 6 1 (H) 09/21/2020     Patient returns after October visit  He underwent his multiparametric MRI  This shows a PI-RADS 1 scoring  Patient returns to discuss findings  Unfortunately, his brother passed away in December  Unclear underlying condition  No direct prostate cancer diagnosis in him  AUA SS 16 QoL 1, HELENA 24    Underwent transperineal biopsy 2/21/20  Patient had persistent low risk disease  We have agreed to continued surveillance  He returns at 6 month interval with PSA  There is slight elevation  Patient is having some more significant musculoskeletal back pain from a prior injury  He continues to have concerns about fertility  He unfortunately only sees his wife in [place] once a year and they have not yet been able to achieve pregnancy      Past Medical History:     Past Medical History: Diagnosis Date    Arthritis     Cholecystolithiasis     Pneumonia        PAST SURGICAL HISTORY:     Past Surgical History:   Procedure Laterality Date    COLONOSCOPY      OR BIOPSY OF PROSTATE,NEEDLE/PUNCH N/A 2/21/2020    Procedure: TRANSRECTAL NEEDLE BIOPSY PROSTATE (TRNBP), *TRANSPERINEAL APPROACH;  Surgeon: Lewis Joyner MD;  [facility];  Service: Urology    WISDOM TOOTH EXTRACTION         CURRENT MEDICATIONS:     Current Outpatient Medications   Medication Sig Dispense Refill    fluocinonide-emollient (LIDEX-E) 0 05 % cream Apply topically 2 (two) times a day 30 g 3    meclizine (ANTIVERT) 12 5 MG tablet TAKE TWO TABLETS BY MOUTH THREE TIMES A DAY FOR DIZZINESS      traMADol (ULTRAM) 50 mg tablet Take 1 tablet (50 mg total) by mouth every 6 (six) hours as needed for moderate pain for up to 5 doses 5 tablet 0     No current facility-administered medications for this visit          ALLERGIES:     Allergies   Allergen Reactions    No Known Allergies        SOCIAL HISTORY:     Social History     Socioeconomic History    Marital status: /Civil Union     Spouse name: None    Number of children: None    Years of education: None    Highest education level: None   Occupational History    None   Social Needs    Financial resource strain: None    Food insecurity     Worry: None     Inability: None    Transportation needs     Medical: None     Non-medical: None   Tobacco Use    Smoking status: Never Smoker    Smokeless tobacco: Never Used   Substance and Sexual Activity    Alcohol use: Never     Frequency: Never    Drug use: Never    Sexual activity: None   Lifestyle    Physical activity     Days per week: None     Minutes per session: None    Stress: None   Relationships    Social connections     Talks on phone: None     Gets together: None     Attends Faith service: None     Active member of club or organization: None     Attends meetings of clubs or organizations: None     Relationship status: None    Intimate partner violence     Fear of current or ex partner: None     Emotionally abused: None     Physically abused: None     Forced sexual activity: None   Other Topics Concern    None   Social History Narrative    None       SOCIAL HISTORY:     Family History   Problem Relation Age of Onset    Prostate cancer Father         2009    Diabetes Mother     Hypertension Mother        REVIEW OF SYSTEMS:     Review of Systems   Constitutional: Negative  Respiratory: Negative  Cardiovascular: Negative  Gastrointestinal: Negative  Genitourinary: Negative  Negative for hematuria  Musculoskeletal: Positive for back pain  Skin: Negative  Neurological: Positive for numbness  Psychiatric/Behavioral: Negative  PHYSICAL EXAM:     Ht 5' 11" (1 803 m)   BMI 29 29 kg/m²     General:  Healthy appearing Black male in no acute distress  They have a normal affect  There is not appear to be any gross neurologic defects or abnormalities  HEENT:  Normocephalic, atraumatic  Neck is supple without any palpable lymphadenopathy  Cardiovascular:  Patient has normal palpable distal radial pulses  There is no significant peripheral edema  No JVD is noted  Respiratory:  Patient has unlabored respirations  There is no audible wheeze or rhonchi  Abdomen:  Abdomen is  without surgical scars  Abdomen is soft and nontender  There is no tympany  Inguinal and umbilical hernia are not appreciated  Musculoskeletal:  Patient does not have significant CVA tenderness in the  flank with palpation or percussion  They full range of motion in all 4 extremities  Strength in all 4 extremities appears congruent  Patient is able to ambulate without assistance or difficulty  Dermatologic:  Patient has no skin abnormalities or rashes        LABS:     CBC: No results found for: WBC, HGB, HCT, MCV, PLT    BMP:   Lab Results   Component Value Date    CALCIUM 9 3 2020    K 3 9 2020    CO2 25 2020     (H) 2020    BUN 17 2020    CREATININE 1 15 2020         IMAGIN/7/20  MULTIPARAMETRIC MRI OF THE PROSTATE WITH AND WITHOUT CONTRAST-WITH 3-D POSTPROCESSING      INDICATION:   C61: Malignant neoplasm of prostate      Family history of prostate cancer in his father and grandfather  Patient a mildly elevated PSA in  and underwent a prostate biopsy through the [facility]  This demonstrated 2 cores Vinicius 6 prostate cancer in the left side of the prostate  Patient   reports he underwent a repeat biopsy in  as part of his surveillance protocol  Pathologic results are not available  Patient was then kept on active surveillance  We have noted some rising his PSA from 3 2 in May of 2014 to 4 06 in May of 2015 to   4 46 in May 2019        COMPARISON: None      PSA LEVEL: 4 46 ng/ml  2019   PRIOR BIOPSY DATE: As above  BIOPSY RESULTS: As above      TECHNIQUE: The following pulse sequences were obtained:  Small field-of-view axial T1-weighted and multiplanar T2-weighted images; DWI axial and ADC map; large field of view axial T2 weighted images; T1w in-phase and opposed-phase axials of entire pelvis   and dynamic 3D T1w axial before and during IV contrast injection         CONTRAST:  Gadobutrol (Gadavist) 9 mL of gadobutrol injection (MULTI-DOSE) ml      TECHNICAL LIMITATIONS: None         FINDINGS:     PROSTATE:     Size (AP x TRV x CC): 3 7 x 4 6 x 4 1 cm = 37 mL  Post-biopsy hemorrhage:  None  Central gland enlargement (BPH): Mild      Focal lesions - No dominant lesion  Findings of BPH with round, completely encapsulated nodule (s)   PI-RADSv2 1 Category 1 -  Very low (clinically significant cancer highly unlikely)      Seminal vesicle invasion: Not present      URINARY BLADDER: Unremarkable      LYMPH NODES: No pelvic lymphadenopathy      BONES: No suspicious osseous lesion            Note: Clinically significant cancer is defined on pathology/histology as Vinicius score greater than or equal to 7, and/or volume of greater than or equal to 0 5 mL, and/or extraprostatic extension      IMPRESSION:     1  PI-RADSv2 1 Category 1 - Very low (clinically significant cancer is highly unlikely to be present)  Mild BPH      2  No extraprostatic tumor, seminal vesicle invasion, pelvic lymphadenopathy, or pelvic osseous metastatic disease      3  Calculated prostate volume of 37 mL      Prostate gland boundaries were segmented using 3D advanced post-processing on an independent [employer] system workstation with active physician participation  PATHOLOGY:     2/21/20  Final Diagnosis    A  Prostate, left anterior lateral, biopsy:  - Focal high-grade prostatic intraepithelial neoplasia (HGPIN)      B  Prostate, left anterior medial, biopsy:  - Benign prostate glands and stroma      C  Prostate, right anterior lateral, biopsy:  - Benign prostate glands and stroma      D  Prostate, right anterior medial, biopsy:  - Prostatic adenocarcinoma, [place] grade 3 + 3 = 6/10 (Grade group 1)  Three small foci of cancer, measuring 2 0 mm in aggregate, involving one of two cores and 15% of the involved core, and spanning 50% of the core discontinuously  - Perineural invasion is absent      E  Prostate, left posterior medial, biopsy:  - Prostatic adenocarcinoma, [place] grade 3 + 3 = 6/10 (Grade group 1)  Two small foci of cancer, measuring 1 5 mm in aggregate, involving one of two cores and 15% of the involved core, and spanning 60% of the core discontinuously  - Perineural invasion is absent      F  Prostate, left posterior lateral, biopsy:  - Prostatic adenocarcinoma, Vinicius grade 3 + 3 = 6 (Grade group 1), involving one of one core and less than 5% of the tissue   - Perineural invasion is absent      G  Prostate, right posterior lateral, biopsy:  - Benign prostate glands and stroma      H   Prostate, right posterior medial, biopsy:  - Prostatic adenocarcinoma, Vinicius grade 3 + 3 = 6 (Grade group 1), involving one of two cores and 10% of the tissue   - Perineural invasion is absent      I  Prostate, right base, biopsy:  - Benign prostate glands and stroma      J  Prostate, left base, biopsy:  - Benign prostate glands and stroma  ASSESSMENT:     64 y o  male with Vinicius 6 prostate cancer in 2/12 cores diagnosed in 2015 with PI-RADS 1 mpMRI and Right Posterior Medial/Anterior Medial and Left Posterior Medial /Posterior Lateral [place] 3+3=6 on repeat transperineal biopsy     PLAN:       Given the slight elevation in his PSA, I recommended a six-month recheck with total and free value  At that point time, the patient will be 1 year from his most recent biopsy  I will perform a rectal exam at that visit  I am not overwhelmed about the PSA change given the patient's 2 biopsies and negative MRI  Patient has again inquired about fertility  As he is only seeing his wife once a year and has not yet achieve pregnancy, I would recommend against fertility evaluation  I offered a semen analysis which he has deferred  Hopefully when his wife is able to move the night and states, they will be able to have more regular intercourse and achieve pregnancy  Patient is having significant musculoskeletal back pain    I have recommended referral to physical therapy

## 2020-10-01 ENCOUNTER — TELEPHONE (OUTPATIENT)
Dept: UROLOGY | Facility: AMBULATORY SURGERY CENTER | Age: 60
End: 2020-10-01

## 2020-12-09 NOTE — PROGRESS NOTES
UROLOGY PEROPERATIVE H&P NOTE     CHIEF COMPLAINT   Yasmine Bone is a 64 y o  male with a complaint of prostate cancer    History of Present Illness:     64 y o  male with a family history of prostate cancer in his father and grandfather  Patient a mildly elevated PSA in 2015 and underwent a prostate biopsy through the [facility]  This demonstrated 2 cores [place] 6 prostate cancer in the left side of the prostate  Patient reports he underwent a repeat biopsy in 2016 as part of his surveillance protocol  I do not have pathologic results of this  There is an office note the raise the question of progression with a Vinicius 3+4=7 prostate cancer  Patient was then kept on active surveillance  We have noted some rising his PSA from 3 2 in May of 2000 14-4 06 in May of 2015 to now 4 46  He presents today as a transfer of care to discuss next steps in his surveillance protocol  He has noted some itching and irritation with urine and spraying of his urinary stream     5/30/19 PSA 4 46 %Free 13 45    Patient returned after October visit  He underwent his multiparametric MRI  This shows a PI-RADS 1 scoring  Patient returns to discuss findings  Unfortunately, his brother passed away in December  Unclear underlying condition  No direct prostate cancer diagnosis in him  AUA SS 16 QoL 1, HELENA 24    Present for transperineal biopsy of the prostate today      Past Medical History:     Past Medical History:   Diagnosis Date    Arthritis     Cholecystolithiasis     Pneumonia        PAST SURGICAL HISTORY:     Past Surgical History:   Procedure Laterality Date    COLONOSCOPY      WISDOM TOOTH EXTRACTION         CURRENT MEDICATIONS:     Current Facility-Administered Medications   Medication Dose Route Frequency Provider Last Rate Last Dose    cefTRIAXone (ROCEPHIN) 1,000 mg in dextrose 5 % 50 mL IVPB  1,000 mg Intravenous Q24H Bernabe Blankenship MD           ALLERGIES:   No Known Allergies    SOCIAL HISTORY: Social History     Socioeconomic History    Marital status: /Civil Union     Spouse name: None    Number of children: None    Years of education: None    Highest education level: None   Occupational History    None   Social Needs    Financial resource strain: None    Food insecurity:     Worry: None     Inability: None    Transportation needs:     Medical: None     Non-medical: None   Tobacco Use    Smoking status: Never Smoker    Smokeless tobacco: Never Used   Substance and Sexual Activity    Alcohol use: Never     Frequency: Never    Drug use: Never    Sexual activity: None   Lifestyle    Physical activity:     Days per week: None     Minutes per session: None    Stress: None   Relationships    Social connections:     Talks on phone: None     Gets together: None     Attends Hindu service: None     Active member of club or organization: None     Attends meetings of clubs or organizations: None     Relationship status: None    Intimate partner violence:     Fear of current or ex partner: None     Emotionally abused: None     Physically abused: None     Forced sexual activity: None   Other Topics Concern    None   Social History Narrative    None       SOCIAL HISTORY:     Family History   Problem Relation Age of Onset    Prostate cancer Father         2009    Diabetes Mother     Hypertension Mother        REVIEW OF SYSTEMS:     Review of Systems   Constitutional: Negative  Respiratory: Negative  Cardiovascular: Negative  Gastrointestinal: Negative  Genitourinary: Positive for dysuria, frequency and urgency  Musculoskeletal: Negative  Skin: Negative  Psychiatric/Behavioral: Negative  PHYSICAL EXAM:     /72   Pulse 55   Temp 97 8 °F (36 6 °C) (Temporal)   Resp 16   Ht 5' 11" (1 803 m)   Wt 94 3 kg (208 lb)   SpO2 100%   BMI 29 01 kg/m²     General:  Healthy appearing    male in no acute distress  They have a normal affect  There is not appear to be any gross neurologic defects or abnormalities  HEENT:  Normocephalic, atraumatic  Neck is supple without any palpable lymphadenopathy  Cardiovascular:  Patient has normal palpable distal radial pulses  There is no significant peripheral edema  No JVD is noted  Respiratory:  Patient has unlabored respirations  There is no audible wheeze or rhonchi  Abdomen:  Abdomen is  without surgical scars  Abdomen is soft and nontender  There is no tympany  Inguinal and umbilical hernia are not appreciated  Musculoskeletal:  Patient does not have significant CVA tenderness in the  flank with palpation or percussion  They full range of motion in all 4 extremities  Strength in all 4 extremities appears congruent  Patient is able to ambulate without assistance or difficulty  Dermatologic:  Patient has no skin abnormalities or rashes  LABS:     CBC: No results found for: WBC, HGB, HCT, MCV, PLT    BMP: No results found for: GLUCOSE, CALCIUM, NA, K, CO2, CL, BUN, CREATININE      IMAGIN/7/20  MULTIPARAMETRIC MRI OF THE PROSTATE WITH AND WITHOUT CONTRAST-WITH 3-D POSTPROCESSING      INDICATION:   C61: Malignant neoplasm of prostate      Family history of prostate cancer in his father and grandfather  Patient a mildly elevated PSA in  and underwent a prostate biopsy through the [facility]  This demonstrated 2 cores Vinicius 6 prostate cancer in the left side of the prostate  Patient   reports he underwent a repeat biopsy in 2016 as part of his surveillance protocol  Pathologic results are not available  Patient was then kept on active surveillance  We have noted some rising his PSA from 3 2 in May of 2014 to 4 06 in May of 2015 to   4 46 in May 2019        COMPARISON: None      PSA LEVEL: 4 46 ng/ml  2019   PRIOR BIOPSY DATE: As above    BIOPSY RESULTS: As above      TECHNIQUE: The following pulse sequences were obtained:  Small field-of-view axial T1-weighted and multiplanar T2-weighted images; DWI axial and ADC map; large field of view axial T2 weighted images; T1w in-phase and opposed-phase axials of entire pelvis   and dynamic 3D T1w axial before and during IV contrast injection         CONTRAST:  Gadobutrol (Gadavist) 9 mL of gadobutrol injection (MULTI-DOSE) ml      TECHNICAL LIMITATIONS: None         FINDINGS:     PROSTATE:     Size (AP x TRV x CC): 3 7 x 4 6 x 4 1 cm = 37 mL  Post-biopsy hemorrhage:  None  Central gland enlargement (BPH): Mild      Focal lesions - No dominant lesion  Findings of BPH with round, completely encapsulated nodule (s)  PI-RADSv2 1 Category 1 -  Very low (clinically significant cancer highly unlikely)      Seminal vesicle invasion: Not present      URINARY BLADDER: Unremarkable      LYMPH NODES: No pelvic lymphadenopathy      BONES: No suspicious osseous lesion            Note: Clinically significant cancer is defined on pathology/histology as Vinicius score greater than or equal to 7, and/or volume of greater than or equal to 0 5 mL, and/or extraprostatic extension      IMPRESSION:     1  PI-RADSv2 1 Category 1 - Very low (clinically significant cancer is highly unlikely to be present)  Mild BPH      2  No extraprostatic tumor, seminal vesicle invasion, pelvic lymphadenopathy, or pelvic osseous metastatic disease      3  Calculated prostate volume of 37 mL      Prostate gland boundaries were segmented using 3D advanced post-processing on an independent [employer] system workstation with active physician participation  ASSESSMENT:     64 y o  male with Vinicius 6 prostate cancer in 2/12 cores diagnosed in 2015 with questionable description of potential progression of 3+4=7 prostate cancer without formal pathology available, managed with active surveillance    PLAN:     At this point time, patient has no findings of lesion on the multiparametric MRI    I do feel strongly he will require an updated biopsy especially given the question of [place] 7 cancer  I have recommended that we approached in a transperineal approach  This will be done under anesthesia in the operating room  Procedure risks and benefits discussed with the patient today and he is very motivated to move forward  Informed consent previously signed  Retinoid Dermatitis Normal Treatment: I recommended more frequent application of Cetaphil or CeraVe to the areas of dermatitis.

## 2021-03-18 ENCOUNTER — DOCUMENTATION (OUTPATIENT)
Dept: UROLOGY | Facility: CLINIC | Age: 61
End: 2021-03-18

## 2021-04-15 ENCOUNTER — TELEPHONE (OUTPATIENT)
Dept: UROLOGY | Facility: CLINIC | Age: 61
End: 2021-04-15

## 2021-04-15 ENCOUNTER — OFFICE VISIT (OUTPATIENT)
Dept: UROLOGY | Facility: CLINIC | Age: 61
End: 2021-04-15
Payer: COMMERCIAL

## 2021-04-15 VITALS
HEIGHT: 71 IN | WEIGHT: 208 LBS | SYSTOLIC BLOOD PRESSURE: 110 MMHG | BODY MASS INDEX: 29.12 KG/M2 | DIASTOLIC BLOOD PRESSURE: 78 MMHG | HEART RATE: 56 BPM

## 2021-04-15 DIAGNOSIS — C61 PROSTATE CANCER (HCC): Primary | ICD-10-CM

## 2021-04-15 PROCEDURE — 99213 OFFICE O/P EST LOW 20 MIN: CPT | Performed by: UROLOGY

## 2021-04-15 NOTE — LETTER
To whom it May Concern,    Mr Asmita Horton is under my professional urologic care  The patient has a history of prostate cancer which was diagnosed in 2015  He has opted for active surveillance as his treatment protocol and because of this requires regular urologic office visits and examinations, intermittent laboratory evaluations and imaging studies as well as invasive procedures  Patient should be considered for any in all dispensations available that are related to this diagnosis  Please feel free to contact me with any questions or concerns for any additional information      Sincerely,    Bell Mariee MD

## 2021-04-15 NOTE — PROGRESS NOTES
UROLOGY FOLLOWUP NOTE     CHIEF COMPLAINT   Edita Wright is a 63 y o  male with a complaint of   Chief Complaint   Patient presents with    Prostate Cancer       History of Present Illness:     63 y o  male with a family history of prostate cancer in his father and grandfather  Patient a mildly elevated PSA in 2015 and underwent a prostate biopsy through the [facility]  This demonstrated 2 cores Vinicius 6 prostate cancer in the left side of the prostate  Patient reports he underwent a repeat biopsy in 2016 as part of his surveillance protocol  I do not have pathologic results of this  Patient was then kept on active surveillance  5/30/19 PSA 4 46 %Free 13 45  Lab Results   Component Value Date    PSA 6 1 (H) 09/21/2020   3/12/21 PSA 4 87, Free 0 62, %free 12 73    Patient returns after October visit  He underwent his multiparametric MRI  This shows a PI-RADS 1 scoring  Underwent surveillance transperineal biopsy 2/21/20  Patient had persistent low risk disease  After last visit, there was a mild elevation in the PSA  Patient had a recheck at 6 month interval and there is declined back to baseline  Patient continues to have some voiding issues with hesitancy and weak stream   He has known urethral meatal stenosis but is disinclined towards meatotomy or surgical intervention      Past Medical History:     Past Medical History:   Diagnosis Date    Arthritis     Cholecystolithiasis     Pneumonia        PAST SURGICAL HISTORY:     Past Surgical History:   Procedure Laterality Date    COLONOSCOPY      MA BIOPSY OF PROSTATE,NEEDLE/PUNCH N/A 2/21/2020    Procedure: TRANSRECTAL NEEDLE BIOPSY PROSTATE (TRNBP), *TRANSPERINEAL APPROACH;  Surgeon: Lewis Joyner MD;  [facility];  Service: Urology    WISDOM TOOTH EXTRACTION         CURRENT MEDICATIONS:     Current Outpatient Medications   Medication Sig Dispense Refill    fluocinonide-emollient (LIDEX-E) 0 05 % cream Apply topically 2 (two) times a day 30 g 3    meclizine (ANTIVERT) 12 5 MG tablet TAKE TWO TABLETS BY MOUTH THREE TIMES A DAY FOR DIZZINESS      traMADol (ULTRAM) 50 mg tablet Take 1 tablet (50 mg total) by mouth every 6 (six) hours as needed for moderate pain for up to 5 doses (Patient not taking: Reported on 4/15/2021) 5 tablet 0     No current facility-administered medications for this visit  ALLERGIES:     Allergies   Allergen Reactions    No Known Allergies        SOCIAL HISTORY:     Social History     Socioeconomic History    Marital status: /Civil Union     Spouse name: None    Number of children: None    Years of education: None    Highest education level: None   Occupational History    None   Social Needs    Financial resource strain: None    Food insecurity     Worry: None     Inability: None    Transportation needs     Medical: None     Non-medical: None   Tobacco Use    Smoking status: Never Smoker    Smokeless tobacco: Never Used   Substance and Sexual Activity    Alcohol use: Never     Frequency: Never    Drug use: Never    Sexual activity: None   Lifestyle    Physical activity     Days per week: None     Minutes per session: None    Stress: None   Relationships    Social connections     Talks on phone: None     Gets together: None     Attends Quaker service: None     Active member of club or organization: None     Attends meetings of clubs or organizations: None     Relationship status: None    Intimate partner violence     Fear of current or ex partner: None     Emotionally abused: None     Physically abused: None     Forced sexual activity: None   Other Topics Concern    None   Social History Narrative    None       SOCIAL HISTORY:     Family History   Problem Relation Age of Onset    Prostate cancer Father         2009    Diabetes Mother     Hypertension Mother        REVIEW OF SYSTEMS:     Review of Systems   Constitutional: Negative  Respiratory: Negative  Cardiovascular: Negative  Gastrointestinal: Negative  Genitourinary: Positive for difficulty urinating  Musculoskeletal: Negative  Skin: Negative  Psychiatric/Behavioral: Negative  PHYSICAL EXAM:     /78   Pulse 56   Ht 5' 11" (1 803 m)   Wt 94 3 kg (208 lb)   BMI 29 01 kg/m²     General:  Healthy appearing Black male in no acute distress  They have a normal affect  There is not appear to be any gross neurologic defects or abnormalities  HEENT:  Normocephalic, atraumatic  Neck is supple without any palpable lymphadenopathy  Cardiovascular:  Patient has normal palpable distal radial pulses  There is no significant peripheral edema  No JVD is noted  Respiratory:  Patient has unlabored respirations  There is no audible wheeze or rhonchi  Abdomen:   Abdomen is soft and nontender  There is no tympany  Inguinal and umbilical hernia are not appreciated  Genitourinary:   Circumcised phallus with tight urethral meatal stenosis, testicles are smooth and descended, rectal exam demonstrates some hemihypertrophy of the right gland which is stable without induration or nodule    Musculoskeletal:  Patient does not have significant CVA tenderness in the  flank with palpation or percussion  They full range of motion in all 4 extremities  Strength in all 4 extremities appears congruent  Patient is able to ambulate without assistance or difficulty  Dermatologic:  Patient has no skin abnormalities or rashes          LABS:     CBC: No results found for: WBC, HGB, HCT, MCV, PLT    BMP:   Lab Results   Component Value Date    CALCIUM 9 3 2020    K 3 9 2020    CO2 25 2020     (H) 2020    BUN 17 2020    CREATININE 1 15 2020     Lab Results   Component Value Date    PSA 6 1 (H) 2020     IMAGIN/7/20  MULTIPARAMETRIC MRI OF THE PROSTATE WITH AND WITHOUT CONTRAST-WITH 3-D POSTPROCESSING      INDICATION:   C61: Malignant neoplasm of prostate      Family history of prostate cancer in his father and grandfather  Patient a mildly elevated PSA in 2015 and underwent a prostate biopsy through the [facility]  This demonstrated 2 cores Vinicius 6 prostate cancer in the left side of the prostate  Patient   reports he underwent a repeat biopsy in 2016 as part of his surveillance protocol  Pathologic results are not available  Patient was then kept on active surveillance  We have noted some rising his PSA from 3 2 in May of 2014 to 4 06 in May of 2015 to   4 46 in May 2019        COMPARISON: None      PSA LEVEL: 4 46 ng/ml  5/2019   PRIOR BIOPSY DATE: As above  BIOPSY RESULTS: As above      TECHNIQUE: The following pulse sequences were obtained:  Small field-of-view axial T1-weighted and multiplanar T2-weighted images; DWI axial and ADC map; large field of view axial T2 weighted images; T1w in-phase and opposed-phase axials of entire pelvis   and dynamic 3D T1w axial before and during IV contrast injection         CONTRAST:  Gadobutrol (Gadavist) 9 mL of gadobutrol injection (MULTI-DOSE) ml      TECHNICAL LIMITATIONS: None         FINDINGS:     PROSTATE:     Size (AP x TRV x CC): 3 7 x 4 6 x 4 1 cm = 37 mL  Post-biopsy hemorrhage:  None  Central gland enlargement (BPH): Mild      Focal lesions - No dominant lesion  Findings of BPH with round, completely encapsulated nodule (s)   PI-RADSv2 1 Category 1 -  Very low (clinically significant cancer highly unlikely)      Seminal vesicle invasion: Not present      URINARY BLADDER: Unremarkable      LYMPH NODES: No pelvic lymphadenopathy      BONES: No suspicious osseous lesion            Note: Clinically significant cancer is defined on pathology/histology as [place] score greater than or equal to 7, and/or volume of greater than or equal to 0 5 mL, and/or extraprostatic extension      IMPRESSION:     1  PI-RADSv2 1 Category 1 - Very low (clinically significant cancer is highly unlikely to be present)  Mild BPH      2  No extraprostatic tumor, seminal vesicle invasion, pelvic lymphadenopathy, or pelvic osseous metastatic disease      3  Calculated prostate volume of 37 mL      Prostate gland boundaries were segmented using 3D advanced post-processing on an independent [employer] system workstation with active physician participation  PATHOLOGY:     2/21/20  Final Diagnosis    A  Prostate, left anterior lateral, biopsy:  - Focal high-grade prostatic intraepithelial neoplasia (HGPIN)      B  Prostate, left anterior medial, biopsy:  - Benign prostate glands and stroma      C  Prostate, right anterior lateral, biopsy:  - Benign prostate glands and stroma      D  Prostate, right anterior medial, biopsy:  - Prostatic adenocarcinoma, Vinicius grade 3 + 3 = 6/10 (Grade group 1)  Three small foci of cancer, measuring 2 0 mm in aggregate, involving one of two cores and 15% of the involved core, and spanning 50% of the core discontinuously  - Perineural invasion is absent      E  Prostate, left posterior medial, biopsy:  - Prostatic adenocarcinoma, [place] grade 3 + 3 = 6/10 (Grade group 1)  Two small foci of cancer, measuring 1 5 mm in aggregate, involving one of two cores and 15% of the involved core, and spanning 60% of the core discontinuously  - Perineural invasion is absent      F  Prostate, left posterior lateral, biopsy:  - Prostatic adenocarcinoma, [place] grade 3 + 3 = 6 (Grade group 1), involving one of one core and less than 5% of the tissue   - Perineural invasion is absent      G  Prostate, right posterior lateral, biopsy:  - Benign prostate glands and stroma      H  Prostate, right posterior medial, biopsy:  - Prostatic adenocarcinoma, Vinicius grade 3 + 3 = 6 (Grade group 1), involving one of two cores and 10% of the tissue   - Perineural invasion is absent      I   Prostate, right base, biopsy:  - Benign prostate glands and stroma      J  Prostate, left base, biopsy:  - Benign prostate glands and stroma  ASSESSMENT:     63 y o  male with [place] 6 prostate cancer in 2/12 cores diagnosed in 2015 with PI-RADS 1 mpMRI and Right Posterior Medial/Anterior Medial and Left Posterior Medial /Posterior Lateral Vinicius 3+3=6 on repeat transperineal biopsy     PLAN:      patient will follow-up in 1 year for PSA and rectal exam   Will continue interval MRI and biopsies as needed in the future  Patient has some voiding symptoms which are likely related to his urethral meatal stenosis    He is not inclined towards intervention at this time but will let me know if his symptoms progress

## 2021-04-15 NOTE — TELEPHONE ENCOUNTER
Return in about 1 year (around 4/15/2022) for 1 year PSA  w/ ZP - [place] office please   Morning  11:15 AM please

## 2022-04-08 ENCOUNTER — TELEPHONE (OUTPATIENT)
Dept: UROLOGY | Facility: AMBULATORY SURGERY CENTER | Age: 62
End: 2022-04-08

## 2022-04-08 NOTE — TELEPHONE ENCOUNTER
Pt managed by Esther Montero, pt received post card schedule yearly follow up at  [facility] I was unable to accommodate please review and contact pt directly

## 2022-04-18 NOTE — TELEPHONE ENCOUNTER
Patient calling to schedule his yearly visit  Patient is scheduled 6/3/22 at 7:45 with Dr Maryan Sahu  Patient will be having psa done at the [place] and will bring a copy of results with him to visit  I also  made patient aware that he will need a new referral from the [place]  Patient agreed

## 2022-07-28 NOTE — TELEPHONE ENCOUNTER
This is a new patient who has never even been seen in our office before  Patient needs to be scheduled for a new patient appointment prior to being scheduled for a prostate biopsy  We also need all of his previous urologist notes as well       Obtain all records and scheduled for a LILLIANA appointment with MD [facility]

## 2024-03-24 ENCOUNTER — HOSPITAL ENCOUNTER (OUTPATIENT)
Dept: MRI IMAGING | Facility: HOSPITAL | Age: 64
Discharge: HOME | End: 2024-03-24

## 2024-03-24 DIAGNOSIS — D07.5 CARCINOMA IN SITU OF PROSTATE: ICD-10-CM

## 2024-04-06 ENCOUNTER — HOSPITAL ENCOUNTER (OUTPATIENT)
Facility: MEDICAL CENTER | Age: 64
Discharge: HOME | End: 2024-04-06
Payer: COMMERCIAL

## 2024-04-06 DIAGNOSIS — D07.5 CARCINOMA IN SITU OF PROSTATE: ICD-10-CM

## 2024-04-06 PROCEDURE — A9585 GADOBUTROL INJECTION: HCPCS | Performed by: RADIOLOGY

## 2024-04-06 PROCEDURE — 72197 MRI PELVIS W/O & W/DYE: CPT

## 2024-04-06 PROCEDURE — 76377 3D RENDER W/INTRP POSTPROCES: CPT

## 2024-04-06 RX ORDER — GADOBUTROL 604.72 MG/ML
9 INJECTION INTRAVENOUS
Status: COMPLETED | OUTPATIENT
Start: 2024-04-06 | End: 2024-04-06

## 2024-04-06 RX ADMIN — GADOBUTROL 9 ML: 604.72 INJECTION INTRAVENOUS at 14:10

## 2024-08-20 ENCOUNTER — HOSPITAL ENCOUNTER (INPATIENT)
Facility: HOSPITAL | Age: 64
LOS: 1 days | Discharge: HOME HEALTH | DRG: 863 | End: 2024-08-23
Attending: EMERGENCY MEDICINE | Admitting: STUDENT IN AN ORGANIZED HEALTH CARE EDUCATION/TRAINING PROGRAM
Payer: COMMERCIAL

## 2024-08-20 ENCOUNTER — APPOINTMENT (EMERGENCY)
Dept: CT IMAGING | Facility: HOSPITAL | Age: 64
DRG: 863 | End: 2024-08-20
Payer: COMMERCIAL

## 2024-08-20 DIAGNOSIS — N41.9 PROSTATITIS: ICD-10-CM

## 2024-08-20 DIAGNOSIS — N39.0 UTI (URINARY TRACT INFECTION): Primary | ICD-10-CM

## 2024-08-20 LAB
ALBUMIN SERPL BCG-MCNC: 4.1 G/DL (ref 3.5–5)
ALP SERPL-CCNC: 120 U/L (ref 34–104)
ALT SERPL W P-5'-P-CCNC: 29 U/L (ref 7–52)
ANION GAP SERPL CALCULATED.3IONS-SCNC: 8 MMOL/L (ref 4–13)
APTT PPP: 30 SECONDS (ref 23–34)
AST SERPL W P-5'-P-CCNC: 22 U/L (ref 13–39)
BACTERIA UR QL AUTO: ABNORMAL /HPF
BASOPHILS # BLD AUTO: 0.04 THOUSANDS/ÂΜL (ref 0–0.1)
BASOPHILS NFR BLD AUTO: 1 % (ref 0–1)
BILIRUB DIRECT SERPL-MCNC: 0.07 MG/DL (ref 0–0.2)
BILIRUB SERPL-MCNC: 0.5 MG/DL (ref 0.2–1)
BILIRUB UR QL STRIP: NEGATIVE
BUN SERPL-MCNC: 11 MG/DL (ref 5–25)
CALCIUM SERPL-MCNC: 9.3 MG/DL (ref 8.4–10.2)
CARDIAC TROPONIN I PNL SERPL HS: 3 NG/L
CHLORIDE SERPL-SCNC: 106 MMOL/L (ref 96–108)
CLARITY UR: ABNORMAL
CO2 SERPL-SCNC: 26 MMOL/L (ref 21–32)
COLOR UR: ABNORMAL
CREAT SERPL-MCNC: 1.1 MG/DL (ref 0.6–1.3)
EOSINOPHIL # BLD AUTO: 0.34 THOUSAND/ÂΜL (ref 0–0.61)
EOSINOPHIL NFR BLD AUTO: 6 % (ref 0–6)
ERYTHROCYTE [DISTWIDTH] IN BLOOD BY AUTOMATED COUNT: 13.3 % (ref 11.6–15.1)
GFR SERPL CREATININE-BSD FRML MDRD: 71 ML/MIN/1.73SQ M
GLUCOSE SERPL-MCNC: 88 MG/DL (ref 65–140)
GLUCOSE UR STRIP-MCNC: NEGATIVE MG/DL
HCT VFR BLD AUTO: 35.2 % (ref 36.5–49.3)
HGB BLD-MCNC: 11.4 G/DL (ref 12–17)
HGB UR QL STRIP.AUTO: ABNORMAL
IMM GRANULOCYTES # BLD AUTO: 0.02 THOUSAND/UL (ref 0–0.2)
IMM GRANULOCYTES NFR BLD AUTO: 0 % (ref 0–2)
INR PPP: 1 (ref 0.85–1.19)
KETONES UR STRIP-MCNC: NEGATIVE MG/DL
LACTATE SERPL-SCNC: 1.5 MMOL/L (ref 0.5–2)
LEUKOCYTE ESTERASE UR QL STRIP: ABNORMAL
LIPASE SERPL-CCNC: 35 U/L (ref 11–82)
LYMPHOCYTES # BLD AUTO: 2.62 THOUSANDS/ÂΜL (ref 0.6–4.47)
LYMPHOCYTES NFR BLD AUTO: 45 % (ref 14–44)
MCH RBC QN AUTO: 29.5 PG (ref 26.8–34.3)
MCHC RBC AUTO-ENTMCNC: 32.4 G/DL (ref 31.4–37.4)
MCV RBC AUTO: 91 FL (ref 82–98)
MONOCYTES # BLD AUTO: 0.64 THOUSAND/ÂΜL (ref 0.17–1.22)
MONOCYTES NFR BLD AUTO: 11 % (ref 4–12)
MUCOUS THREADS UR QL AUTO: ABNORMAL
NEUTROPHILS # BLD AUTO: 2.16 THOUSANDS/ÂΜL (ref 1.85–7.62)
NEUTS SEG NFR BLD AUTO: 37 % (ref 43–75)
NITRITE UR QL STRIP: NEGATIVE
NON-SQ EPI CELLS URNS QL MICRO: ABNORMAL /HPF
NRBC BLD AUTO-RTO: 0 /100 WBCS
PH UR STRIP.AUTO: 5.5 [PH]
PLATELET # BLD AUTO: 263 THOUSANDS/UL (ref 149–390)
PMV BLD AUTO: 9.8 FL (ref 8.9–12.7)
POTASSIUM SERPL-SCNC: 3.6 MMOL/L (ref 3.5–5.3)
PROCALCITONIN SERPL-MCNC: 0.09 NG/ML
PROT SERPL-MCNC: 7.9 G/DL (ref 6.4–8.4)
PROT UR STRIP-MCNC: NEGATIVE MG/DL
PROTHROMBIN TIME: 13.9 SECONDS (ref 12.3–15)
RBC # BLD AUTO: 3.86 MILLION/UL (ref 3.88–5.62)
RBC #/AREA URNS AUTO: ABNORMAL /HPF
SODIUM SERPL-SCNC: 140 MMOL/L (ref 135–147)
SP GR UR STRIP.AUTO: 1.01 (ref 1–1.03)
UROBILINOGEN UR STRIP-ACNC: <2 MG/DL
WBC # BLD AUTO: 5.82 THOUSAND/UL (ref 4.31–10.16)
WBC #/AREA URNS AUTO: ABNORMAL /HPF

## 2024-08-20 PROCEDURE — 96365 THER/PROPH/DIAG IV INF INIT: CPT

## 2024-08-20 PROCEDURE — 36415 COLL VENOUS BLD VENIPUNCTURE: CPT | Performed by: EMERGENCY MEDICINE

## 2024-08-20 PROCEDURE — 85610 PROTHROMBIN TIME: CPT | Performed by: EMERGENCY MEDICINE

## 2024-08-20 PROCEDURE — 99285 EMERGENCY DEPT VISIT HI MDM: CPT | Performed by: EMERGENCY MEDICINE

## 2024-08-20 PROCEDURE — 83690 ASSAY OF LIPASE: CPT | Performed by: EMERGENCY MEDICINE

## 2024-08-20 PROCEDURE — 93005 ELECTROCARDIOGRAM TRACING: CPT

## 2024-08-20 PROCEDURE — 74177 CT ABD & PELVIS W/CONTRAST: CPT

## 2024-08-20 PROCEDURE — 99284 EMERGENCY DEPT VISIT MOD MDM: CPT

## 2024-08-20 PROCEDURE — 80048 BASIC METABOLIC PNL TOTAL CA: CPT | Performed by: EMERGENCY MEDICINE

## 2024-08-20 PROCEDURE — 87086 URINE CULTURE/COLONY COUNT: CPT | Performed by: EMERGENCY MEDICINE

## 2024-08-20 PROCEDURE — 87077 CULTURE AEROBIC IDENTIFY: CPT | Performed by: EMERGENCY MEDICINE

## 2024-08-20 PROCEDURE — 85730 THROMBOPLASTIN TIME PARTIAL: CPT | Performed by: EMERGENCY MEDICINE

## 2024-08-20 PROCEDURE — 84484 ASSAY OF TROPONIN QUANT: CPT | Performed by: EMERGENCY MEDICINE

## 2024-08-20 PROCEDURE — 80076 HEPATIC FUNCTION PANEL: CPT | Performed by: EMERGENCY MEDICINE

## 2024-08-20 PROCEDURE — 85025 COMPLETE CBC W/AUTO DIFF WBC: CPT | Performed by: EMERGENCY MEDICINE

## 2024-08-20 PROCEDURE — 84145 PROCALCITONIN (PCT): CPT | Performed by: EMERGENCY MEDICINE

## 2024-08-20 PROCEDURE — 83605 ASSAY OF LACTIC ACID: CPT | Performed by: EMERGENCY MEDICINE

## 2024-08-20 PROCEDURE — 87186 SC STD MICRODIL/AGAR DIL: CPT | Performed by: EMERGENCY MEDICINE

## 2024-08-20 PROCEDURE — 81001 URINALYSIS AUTO W/SCOPE: CPT | Performed by: EMERGENCY MEDICINE

## 2024-08-20 PROCEDURE — 87181 SC STD AGAR DILUTION PER AGT: CPT | Performed by: EMERGENCY MEDICINE

## 2024-08-20 PROCEDURE — 87040 BLOOD CULTURE FOR BACTERIA: CPT | Performed by: EMERGENCY MEDICINE

## 2024-08-20 RX ORDER — PHENAZOPYRIDINE HYDROCHLORIDE 100 MG/1
200 TABLET, FILM COATED ORAL ONCE
Status: COMPLETED | OUTPATIENT
Start: 2024-08-20 | End: 2024-08-20

## 2024-08-20 RX ADMIN — ERTAPENEM SODIUM 1000 MG: 1 INJECTION, POWDER, LYOPHILIZED, FOR SOLUTION INTRAMUSCULAR; INTRAVENOUS at 22:40

## 2024-08-20 RX ADMIN — IOHEXOL 100 ML: 350 INJECTION, SOLUTION INTRAVENOUS at 23:10

## 2024-08-20 RX ADMIN — SODIUM CHLORIDE 1000 ML: 0.9 INJECTION, SOLUTION INTRAVENOUS at 22:17

## 2024-08-20 RX ADMIN — PHENAZOPYRIDINE 200 MG: 100 TABLET ORAL at 22:24

## 2024-08-21 PROBLEM — R00.1 BRADYCARDIA: Status: ACTIVE | Noted: 2024-08-21

## 2024-08-21 PROBLEM — N39.0 UTI (URINARY TRACT INFECTION): Status: ACTIVE | Noted: 2024-08-21

## 2024-08-21 PROBLEM — N41.9 PROSTATITIS: Status: ACTIVE | Noted: 2024-08-21

## 2024-08-21 LAB
ALBUMIN SERPL BCG-MCNC: 3.5 G/DL (ref 3.5–5)
ALP SERPL-CCNC: 109 U/L (ref 34–104)
ALT SERPL W P-5'-P-CCNC: 26 U/L (ref 7–52)
ANION GAP SERPL CALCULATED.3IONS-SCNC: 4 MMOL/L (ref 4–13)
AST SERPL W P-5'-P-CCNC: 19 U/L (ref 13–39)
ATRIAL RATE: 53 BPM
BILIRUB SERPL-MCNC: 0.77 MG/DL (ref 0.2–1)
BUN SERPL-MCNC: 9 MG/DL (ref 5–25)
CALCIUM SERPL-MCNC: 9 MG/DL (ref 8.4–10.2)
CHLORIDE SERPL-SCNC: 106 MMOL/L (ref 96–108)
CO2 SERPL-SCNC: 29 MMOL/L (ref 21–32)
CREAT SERPL-MCNC: 1.1 MG/DL (ref 0.6–1.3)
GFR SERPL CREATININE-BSD FRML MDRD: 71 ML/MIN/1.73SQ M
GLUCOSE P FAST SERPL-MCNC: 90 MG/DL (ref 65–99)
GLUCOSE SERPL-MCNC: 90 MG/DL (ref 65–140)
P AXIS: 31 DEGREES
POTASSIUM SERPL-SCNC: 3.6 MMOL/L (ref 3.5–5.3)
PR INTERVAL: 198 MS
PROT SERPL-MCNC: 7.2 G/DL (ref 6.4–8.4)
QRS AXIS: 35 DEGREES
QRSD INTERVAL: 92 MS
QT INTERVAL: 430 MS
QTC INTERVAL: 403 MS
SODIUM SERPL-SCNC: 139 MMOL/L (ref 135–147)
T WAVE AXIS: 28 DEGREES
VENTRICULAR RATE: 53 BPM

## 2024-08-21 PROCEDURE — 80053 COMPREHEN METABOLIC PANEL: CPT | Performed by: HOSPITALIST

## 2024-08-21 PROCEDURE — NC001 PR NO CHARGE: Performed by: UROLOGY

## 2024-08-21 PROCEDURE — 93010 ELECTROCARDIOGRAM REPORT: CPT | Performed by: INTERNAL MEDICINE

## 2024-08-21 PROCEDURE — 99222 1ST HOSP IP/OBS MODERATE 55: CPT | Performed by: UROLOGY

## 2024-08-21 PROCEDURE — 99223 1ST HOSP IP/OBS HIGH 75: CPT | Performed by: HOSPITALIST

## 2024-08-21 PROCEDURE — 99222 1ST HOSP IP/OBS MODERATE 55: CPT | Performed by: INTERNAL MEDICINE

## 2024-08-21 RX ORDER — TRAMADOL HYDROCHLORIDE 50 MG/1
50 TABLET ORAL EVERY 6 HOURS PRN
Status: DISCONTINUED | OUTPATIENT
Start: 2024-08-21 | End: 2024-08-23 | Stop reason: HOSPADM

## 2024-08-21 RX ORDER — TAMSULOSIN HYDROCHLORIDE 0.4 MG/1
0.4 CAPSULE ORAL
Status: DISCONTINUED | OUTPATIENT
Start: 2024-08-21 | End: 2024-08-23 | Stop reason: HOSPADM

## 2024-08-21 RX ORDER — ACETAMINOPHEN 325 MG/1
650 TABLET ORAL EVERY 6 HOURS PRN
Status: DISCONTINUED | OUTPATIENT
Start: 2024-08-21 | End: 2024-08-23 | Stop reason: HOSPADM

## 2024-08-21 RX ORDER — TAMSULOSIN HYDROCHLORIDE 0.4 MG/1
0.4 CAPSULE ORAL
COMMUNITY
Start: 2024-03-07 | End: 2025-03-08

## 2024-08-21 RX ORDER — POLYETHYLENE GLYCOL 3350 17 G/17G
17 POWDER, FOR SOLUTION ORAL DAILY PRN
Status: DISCONTINUED | OUTPATIENT
Start: 2024-08-21 | End: 2024-08-23 | Stop reason: HOSPADM

## 2024-08-21 RX ORDER — MECLIZINE HCL 12.5 MG 12.5 MG/1
12.5 TABLET ORAL EVERY 8 HOURS PRN
Status: DISCONTINUED | OUTPATIENT
Start: 2024-08-21 | End: 2024-08-23 | Stop reason: HOSPADM

## 2024-08-21 RX ORDER — KETOROLAC TROMETHAMINE 30 MG/ML
15 INJECTION, SOLUTION INTRAMUSCULAR; INTRAVENOUS EVERY 6 HOURS PRN
Status: DISCONTINUED | OUTPATIENT
Start: 2024-08-21 | End: 2024-08-22

## 2024-08-21 RX ORDER — PHENAZOPYRIDINE HYDROCHLORIDE 100 MG/1
200 TABLET, FILM COATED ORAL
Status: DISCONTINUED | OUTPATIENT
Start: 2024-08-21 | End: 2024-08-23 | Stop reason: HOSPADM

## 2024-08-21 RX ORDER — HEPARIN SODIUM 5000 [USP'U]/ML
5000 INJECTION, SOLUTION INTRAVENOUS; SUBCUTANEOUS EVERY 8 HOURS SCHEDULED
Status: DISCONTINUED | OUTPATIENT
Start: 2024-08-21 | End: 2024-08-23 | Stop reason: HOSPADM

## 2024-08-21 RX ORDER — SODIUM CHLORIDE, SODIUM LACTATE, POTASSIUM CHLORIDE, CALCIUM CHLORIDE 600; 310; 30; 20 MG/100ML; MG/100ML; MG/100ML; MG/100ML
100 INJECTION, SOLUTION INTRAVENOUS CONTINUOUS
Status: DISCONTINUED | OUTPATIENT
Start: 2024-08-21 | End: 2024-08-21

## 2024-08-21 RX ORDER — ERTAPENEM 1 G/1
1000 INJECTION, POWDER, LYOPHILIZED, FOR SOLUTION INTRAMUSCULAR; INTRAVENOUS EVERY 24 HOURS
Status: DISCONTINUED | OUTPATIENT
Start: 2024-08-21 | End: 2024-08-21

## 2024-08-21 RX ADMIN — PHENAZOPYRIDINE 200 MG: 100 TABLET ORAL at 14:08

## 2024-08-21 RX ADMIN — PHENAZOPYRIDINE 200 MG: 100 TABLET ORAL at 08:06

## 2024-08-21 RX ADMIN — PHENAZOPYRIDINE 200 MG: 100 TABLET ORAL at 17:50

## 2024-08-21 RX ADMIN — ERTAPENEM SODIUM 1000 MG: 1 INJECTION, POWDER, LYOPHILIZED, FOR SOLUTION INTRAMUSCULAR; INTRAVENOUS at 22:36

## 2024-08-21 RX ADMIN — TAMSULOSIN HYDROCHLORIDE 0.4 MG: 0.4 CAPSULE ORAL at 21:47

## 2024-08-21 RX ADMIN — SODIUM CHLORIDE, SODIUM LACTATE, POTASSIUM CHLORIDE, AND CALCIUM CHLORIDE 100 ML/HR: .6; .31; .03; .02 INJECTION, SOLUTION INTRAVENOUS at 06:38

## 2024-08-21 NOTE — PLAN OF CARE
Problem: GENITOURINARY - ADULT  Goal: Maintains or returns to baseline urinary function  Description: INTERVENTIONS:  - Assess urinary function  - Encourage oral fluids to ensure adequate hydration if ordered  - Administer IV fluids as ordered to ensure adequate hydration  - Administer ordered medications as needed  - Offer frequent toileting  - Follow urinary retention protocol if ordered  Outcome: Progressing  Goal: Absence of urinary retention  Description: INTERVENTIONS:  - Assess patient’s ability to void and empty bladder  - Monitor I/O  - Bladder scan as needed  - Discuss with physician/AP medications to alleviate retention as needed  - Discuss catheterization for long term situations as appropriate  Outcome: Progressing  Goal: Urinary catheter remains patent  Description: INTERVENTIONS:  - Assess patency of urinary catheter  - If patient has a chronic rodriges, consider changing catheter if non-functioning  - Follow guidelines for intermittent irrigation of non-functioning urinary catheter  Outcome: Progressing
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
## 2024-08-21 NOTE — ASSESSMENT & PLAN NOTE
--Hx of   --Currently symptomatic  --Had an MDRO (E.coli at [facility] recently treated with Ertapenem  --Will resume Ertapenem while awaiting urine culture result for sensitivity    --Resume Pyridium and prn pain medication   --IVF   --Urology consulted

## 2024-08-21 NOTE — ASSESSMENT & PLAN NOTE
Hx elevated PSA, underwent prostate biopsy last month complicated by ESBL E.coli bacteremia, completed ertapenem course. Presenting with worsening dysuria, frequency, urgency  Admission on CT Enlarged prostate with mild adjacent inflammatory stranding. No abscess.   VSS, afebrile  UA possible infection  No leukocytosis  Urine and blood cultures pending  Patient started on ertapenem   Continue ertapenem, consider ID consult once cultures result  NSAIDs for inflammation  Continue pyridium for dysuria  PVR ordered  [facility].   Urology will continue to follow
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
## 2024-08-21 NOTE — NURSING NOTE
Pt admitted to this unit 0300. Pt is in bed with slight discomfort  with  4/10 pain in abdomen. This RN reached out to provider. Provider is aware. No new orders have been placed.

## 2024-08-21 NOTE — CASE MANAGEMENT
Case Management Assessment & Discharge Planning Note    Patient name Rony Johnson  Location /-01 MRN [id number]  : 1960 Date 2024       Current Admission Date: 2024  Current Admission Diagnosis:Prostatitis   Patient Active Problem List    Diagnosis Date Noted Date Diagnosed    Prostatitis 2024     UTI (urinary tract infection) 2024     Bradycardia 2024     Prostate cancer (HCC) 10/31/2019     Family history of prostate cancer in father 10/31/2019     Urethral meatal stenosis 10/31/2019       LOS (days): 0  Geometric Mean LOS (GMLOS) (days):   Days to GMLOS:     OBJECTIVE:              Current admission status: Observation       Preferred Pharmacy:   [id number] - [place] - [address]  [address]  [place] [address]  Phone: [phone] Fax: [phone]    Primary Care Provider: Laverne Miranda MD    Primary Insurance: [facility]  Secondary Insurance:     ASSESSMENT:  Active Health Care Proxies       YULIET REYES Health Care Representative - Sister   Primary Phone: [phone] (Mobile)                 Advance Directives  Does patient have a Health Care POA?: Yes  Does patient currently have a Health Care decision maker?: Yes, please see Health Care Proxy section  Does patient have Advance Directives?: Yes  Advance Directives: Power of  for health care  Primary Contact: Spouse and sister         Readmission Root Cause  30 Day Readmission: No    Patient Information  Admitted from:: Home  Mental Status: Alert  During Assessment patient was accompanied by: Not accompanied during assessment  Assessment information provided by:: Patient  Primary Caregiver: Self  Support Systems: Self, Spouse/significant other  County of Residence: [place]  What city do you live in?: [place]  Home entry access options. Select all that apply.: Stairs  Number of steps to enter home.: 10  Do the steps have railings?:  Yes  Type of Current Residence: Bi-level  Upon entering residence, is there a bedroom on the main floor (no further steps)?: Yes  Upon entering residence, is there a bathroom on the main floor (no further steps)?: Yes  Living Arrangements: Lives w/ Spouse/significant other  Is patient a ?: Yes  [facility] with [facility]?: Yes  Is patient service connected?: Yes    Activities of Daily Living Prior to Admission  Functional Status: Independent  Completes ADLs independently?: No  Level of ADL dependence: Assistance  Ambulates independently?: Yes  Does patient use assisted devices?: No  Does patient currently own DME?: No  Does patient have a history of Outpatient Therapy (PT/OT)?: No  Does the patient have a history of Short-Term Rehab?: No  Does patient have a history of HHC?: Yes  Does patient currently have HHC?: Yes    Current Home Health Care  Type of Current Home Care Services: Home health aide  Current Home Health Agency:: Other (please enter name in comment) ([facility] and [facility] for PICC line IV Abx.)  Current Home Health Follow-Up Provider:: PCP    Patient Information Continued  Income Source: Pension/long term  Does patient have prescription coverage?: Yes  Does patient receive dialysis treatments?: No  Does patient have a history of substance abuse?: No  Does patient have a history of Mental Health Diagnosis?: No    PHQ 2/9 Screening   Reviewed PHQ 2/9 Depression Screening Score?: No    Means of Transportation  Means of Transport to Appts:: Drives Self      Social Determinants of Health (SDOH)      Flowsheet Row Most Recent Value   Housing Stability    In the last 12 months, was there a time when you were not able to pay the mortgage or rent on time? N   In the past 12 months, how many times have you moved where you were living? 1   At any time in the past 12 months, were you homeless or living in a shelter (including now)? N   Transportation Needs    In the past 12 months, has  lack of transportation kept you from medical appointments or from getting medications? no   In the past 12 months, has lack of transportation kept you from meetings, work, or from getting things needed for daily living? No   Food Insecurity    Within the past 12 months, you worried that your food would run out before you got the money to buy more. Never true   Within the past 12 months, the food you bought just didn't last and you didn't have money to get more. Never true   Utilities    In the past 12 months has the electric, gas, oil, or water company threatened to shut off services in your home? No            DISCHARGE DETAILS:    Discharge planning discussed with:: Patient at bedside  Freedom of Choice: Yes  Comments - Freedom of Choice: CM discussed discharge planning and freedom of choice if services are recommended by [facility]. Patient may need to resume PICC line with [facility] and [facility].  CM contacted family/caregiver?: No- see comments (Patient declined.)  Were Treatment Team discharge recommendations reviewed with patient/caregiver?: Yes  Did patient/caregiver verbalize understanding of patient care needs?: Yes  Were patient/caregiver advised of the risks associated with not following Treatment Team discharge recommendations?: Yes         Requested Home Health Care         Is the patient interested in HHC at discharge?: Yes  Home Health Discipline requested:: Nursing  Home Health Agency Name:: Other ([facility])  HHA External Referral Reason (only applicable if external HHA name selected): Patient has established relationship with provider  Home Health Follow-Up Provider:: PCP  Home Health Services Needed:: [facility]    DME Referral Provided  Referral made for DME?: Yes  DME referral completed for the following items:: Nicole Osullivan  DME Supplier Name:: [facility]    Other Referral/Resources/Interventions Provided:  Interventions: HHC    Would you like to participate in our [facility]  service program?  : No - Declined    Treatment Team Recommendation: Home with Home Health Care  Discharge Destination Plan:: Home with Home Health Care  Transport at Discharge : Family

## 2024-08-22 LAB
ALBUMIN SERPL BCG-MCNC: 3.4 G/DL (ref 3.5–5)
ALP SERPL-CCNC: 107 U/L (ref 34–104)
ALT SERPL W P-5'-P-CCNC: 26 U/L (ref 7–52)
ANION GAP SERPL CALCULATED.3IONS-SCNC: 5 MMOL/L (ref 4–13)
AST SERPL W P-5'-P-CCNC: 19 U/L (ref 13–39)
BILIRUB SERPL-MCNC: 0.48 MG/DL (ref 0.2–1)
BUN SERPL-MCNC: 10 MG/DL (ref 5–25)
CALCIUM ALBUM COR SERPL-MCNC: 9.6 MG/DL (ref 8.3–10.1)
CALCIUM SERPL-MCNC: 9.1 MG/DL (ref 8.4–10.2)
CHLORIDE SERPL-SCNC: 106 MMOL/L (ref 96–108)
CO2 SERPL-SCNC: 29 MMOL/L (ref 21–32)
CREAT SERPL-MCNC: 1.17 MG/DL (ref 0.6–1.3)
GFR SERPL CREATININE-BSD FRML MDRD: 66 ML/MIN/1.73SQ M
GLUCOSE P FAST SERPL-MCNC: 88 MG/DL (ref 65–99)
GLUCOSE SERPL-MCNC: 88 MG/DL (ref 65–140)
POTASSIUM SERPL-SCNC: 3.8 MMOL/L (ref 3.5–5.3)
PROT SERPL-MCNC: 7 G/DL (ref 6.4–8.4)
SODIUM SERPL-SCNC: 140 MMOL/L (ref 135–147)

## 2024-08-22 PROCEDURE — 99232 SBSQ HOSP IP/OBS MODERATE 35: CPT | Performed by: INTERNAL MEDICINE

## 2024-08-22 PROCEDURE — 02HV33Z INSERTION OF INFUSION DEVICE INTO SUPERIOR VENA CAVA, PERCUTANEOUS APPROACH: ICD-10-PCS | Performed by: STUDENT IN AN ORGANIZED HEALTH CARE EDUCATION/TRAINING PROGRAM

## 2024-08-22 PROCEDURE — 99233 SBSQ HOSP IP/OBS HIGH 50: CPT | Performed by: STUDENT IN AN ORGANIZED HEALTH CARE EDUCATION/TRAINING PROGRAM

## 2024-08-22 PROCEDURE — 99232 SBSQ HOSP IP/OBS MODERATE 35: CPT | Performed by: UROLOGY

## 2024-08-22 PROCEDURE — 80053 COMPREHEN METABOLIC PANEL: CPT | Performed by: HOSPITALIST

## 2024-08-22 RX ADMIN — ERTAPENEM SODIUM 1000 MG: 1 INJECTION, POWDER, LYOPHILIZED, FOR SOLUTION INTRAMUSCULAR; INTRAVENOUS at 22:05

## 2024-08-22 RX ADMIN — HEPARIN SODIUM 5000 UNITS: 5000 INJECTION INTRAVENOUS; SUBCUTANEOUS at 15:01

## 2024-08-22 RX ADMIN — PHENAZOPYRIDINE 200 MG: 100 TABLET ORAL at 12:12

## 2024-08-22 RX ADMIN — PHENAZOPYRIDINE 200 MG: 100 TABLET ORAL at 08:08

## 2024-08-22 RX ADMIN — TAMSULOSIN HYDROCHLORIDE 0.4 MG: 0.4 CAPSULE ORAL at 22:05

## 2024-08-22 RX ADMIN — PHENAZOPYRIDINE 200 MG: 100 TABLET ORAL at 17:14

## 2024-08-22 NOTE — ASSESSMENT & PLAN NOTE
--UA (+)  -- Urine culture showed 100,000 cfu/ml Gram Negative Ej Enteric Like Abnormal    ID recommended 4 weeks of ertapenem  Patient getting PICC line today

## 2024-08-22 NOTE — ASSESSMENT & PLAN NOTE
--Hx of   --Currently symptomatic  --Had an MDRO (E.coli at [facility] recently treated with Ertapenem  -- Appreciate urology and ID  Getting PICC line today as per ID recommendations  Blood cultures negative for 24 hours

## 2024-08-22 NOTE — ASSESSMENT & PLAN NOTE
--Asymptomatic  --Monitor  Outpatient follow-up with cardiology  Sleep medicine follow-up to rule out sleep apnea

## 2024-08-22 NOTE — PROGRESS NOTES
"[facility]  Progress Note  Name: Rony Johnson I  MRN: [id number]  Unit/Bed#: -01 I Date of Admission: 8/20/2024   Date of Service: 8/22/2024 I Hospital Day: 0    Assessment & Plan   * Prostatitis  Assessment & Plan  Hx elevated PSA, underwent prostate biopsy last month complicated by ESBL E.coli bacteremia, completed ertapenem course. Presenting with worsening dysuria, frequency, urgency  Admission on CT Enlarged prostate with mild adjacent inflammatory stranding. No abscess.   VSS, afebrile  UA gram negative rods, likely e coli  No leukocytosis  BC no growth  Patient started on ertapenem   ID following, ertapenem for 4 weeks   Outpatient follow up with primary urologist   Urology will sign off.          Subjective:   HPI: seen at bedside. Reports slight improvement in dysuria. Pyridium helps. Staying hydrated.    Review of Systems   Constitutional:  Negative for chills and fever.   Respiratory:  Negative for cough and shortness of breath.    Cardiovascular:  Negative for chest pain and palpitations.   Gastrointestinal:  Negative for abdominal pain and vomiting.   Genitourinary:  Positive for dysuria and frequency. Negative for hematuria.   Musculoskeletal:  Negative for arthralgias and back pain.   Skin:  Negative for color change and rash.   Neurological:  Negative for seizures and syncope.   All other systems reviewed and are negative.      Objective:    Vitals: Blood pressure 127/76, pulse (!) 49, temperature 98.1 °F (36.7 °C), resp. rate 17, height 5' 11\" (1.803 m), weight 93.3 kg (205 lb 11 oz), SpO2 91%.,Body mass index is 28.69 kg/m².    Physical Exam  Constitutional:       General: He is not in acute distress.     Appearance: Normal appearance. He is normal weight. He is not ill-appearing or toxic-appearing.   HENT:      Head: Normocephalic and atraumatic.      Right Ear: External ear normal.      Left Ear: External ear normal.      Nose: Nose normal.      Mouth/Throat:      " Mouth: Mucous membranes are moist.   Eyes:      General: No scleral icterus.     Conjunctiva/sclera: Conjunctivae normal.   Cardiovascular:      Rate and Rhythm: Normal rate.      Pulses: Normal pulses.   Pulmonary:      Effort: Pulmonary effort is normal.   Musculoskeletal:      Cervical back: Normal range of motion.   Neurological:      General: No focal deficit present.      Mental Status: He is alert. Mental status is at baseline.       Labs:  Recent Labs     08/20/24  2207   WBC 5.82       Recent Labs     08/20/24 2207   HGB 11.4*     Recent Labs     08/20/24  2207   HCT 35.2*     Recent Labs     08/20/24  2207 08/21/24  0646 08/22/24  0505   CREATININE 1.10 1.10 1.17         History:    Past Medical History:   Diagnosis Date    Arthritis     Cholecystolithiasis     Pneumonia      Social History     Socioeconomic History    Marital status: /Civil Union     Spouse name: None    Number of children: None    Years of education: None    Highest education level: None   Occupational History    None   Tobacco Use    Smoking status: Never    Smokeless tobacco: Never   Vaping Use    Vaping status: Never Used   Substance and Sexual Activity    Alcohol use: Never    Drug use: Never    Sexual activity: None   Other Topics Concern    None   Social History Narrative    None     Social Determinants of Health     Financial Resource Strain: Not on file   Food Insecurity: No Food Insecurity (8/21/2024)    Hunger Vital Sign     Worried About Running Out of Food in the Last Year: Never true     Ran Out of Food in the Last Year: Never true   Transportation Needs: No Transportation Needs (8/21/2024)    PRAPARE - Transportation     Lack of Transportation (Medical): No     Lack of Transportation (Non-Medical): No   Physical Activity: Not on file   Stress: Not on file   Social Connections: Unknown (6/18/2024)    Received from [employer]    Social REPUCOM     How often do you feel lonely or isolated from those around you?  (Adult - for ages 18 years and over): Not on file   Intimate Partner Violence: Not on file   Housing Stability: Low Risk  (8/21/2024)    Housing Stability Vital Sign     Unable to Pay for Housing in the Last Year: No     Number of Times Moved in the Last Year: 1     Homeless in the Last Year: No     Past Surgical History:   Procedure Laterality Date    COLONOSCOPY      DE PROSTATE NEEDLE BIOPSY ANY APPROACH N/A 2/21/2020    Procedure: TRANSRECTAL NEEDLE BIOPSY PROSTATE (TRNBP), *TRANSPERINEAL APPROACH;  Surgeon: Azael Doan MD;  Location: [facility];  Service: Urology    WISDOM TOOTH EXTRACTION       Family History   Problem Relation Age of Onset    Prostate cancer Father         2009    Diabetes Mother     Hypertension Mother        Shayna Dow PA-C  Date: 8/22/2024 Time: 10:49 AM

## 2024-08-22 NOTE — CASE MANAGEMENT
Case Management Discharge Planning Note    Patient name Rony Johnson  Location /-01 MRN [id number]  : 1960 Date 2024       Current Admission Date: 2024  Current Admission Diagnosis:Prostatitis   Patient Active Problem List    Diagnosis Date Noted Date Diagnosed    Prostatitis 2024     UTI (urinary tract infection) 2024     Bradycardia 2024     Prostate cancer (HCC) 10/31/2019     Family history of prostate cancer in father 10/31/2019     Urethral meatal stenosis 10/31/2019       LOS (days): 0  Geometric Mean LOS (GMLOS) (days):   Days to GMLOS:     OBJECTIVE:            Current admission status: Inpatient   Preferred Pharmacy:   [id number] - [place] - [address]  [address]  [place] [address]  Phone: [phone] Fax: [phone]    Primary Care Provider: Laverne Miranda MD    Primary Insurance: [facility]  Secondary Insurance:     DISCHARGE DETAILS:  As per SLIM rounds, patient is medically cleared to be discharged home to resume PICC line IV Abx. Patient will need to be on the IV ABX for 4 weeks (thru 2024). CM contacted [employer] via [employer] to resume services and uploaded new scripts and MAR. Patient awaiting approval for straight cane from [facility] order to be discharged with as well.

## 2024-08-22 NOTE — PROCEDURES
Venous Access Line Insertion    Date/Time: 8/22/2024 12:15 PM    Performed by: Kera Steele RN  Authorized by: London Dickerson MD    Patient location:  Bedside  Consent:     Consent obtained:  Written    Consent given by:  Patient  Universal protocol:     Procedure explained and questions answered to patient or proxy's satisfaction: yes      Immediately prior to procedure, a time out was called: yes      Required blood products, implants, devices, and special equipment available: yes      Site/side marked: yes      Patient identity confirmed:  Verbally with patient and arm band  Pre-procedure details:     Hand hygiene: Hand hygiene performed prior to insertion      Sterile barrier technique: All elements of maximal sterile technique followed      Skin preparation:  ChloraPrep  Procedure details:     Complex Venous Access Line Type: PICC      Complex Venous Access Line Indications: long term antibiotics      Catheter tip vessel location: superior vena cava      Orientation:  Right    Location:  Basilic    Procedural supplies:  Single lumen    Catheter size:  4 Fr ([id number] [id number])    Total catheter length (cm):  46    Catheter out on skin (cm):  0    Max flow rate:  999ml/hr    Arm circumference:  34    Approach: percutaneous technique used      Patient position:  Flat    Ultrasound image availability:  Still images obtained    Sterile ultrasound techniques: Sterile gel and sterile probe covers were used      Number of attempts:  1    Successful placement: yes      Landmarks identified: yes      Vessel of catheter tip end:  Sherlock 3CG confirmed  Anesthesia (see MAR for exact dosages):     Anesthesia method:  Local infiltration    Local anesthetic:  Lidocaine 1% w/o epi  Post-procedure details:     Post-procedure:  Dressing applied and securement device placed    Assessment:  Blood return through all ports and free fluid flow    Patient tolerance of procedure:  Tolerated well, no immediate  complications    Observer: Yes      Observer name:  aMttie London RN

## 2024-08-22 NOTE — PROGRESS NOTES
[facility]  Progress Note  Name: Rony Johnson I  MRN: [id number]  Unit/Bed#: -01 GENA Date of Admission: 2024   Date of Service: 2024 I Hospital Day: 0    Assessment & Plan   Bradycardia  Assessment & Plan  --Asymptomatic  --Monitor  Outpatient follow-up with cardiology  Sleep medicine follow-up to rule out sleep apnea    UTI (urinary tract infection)  Assessment & Plan  --UA (+)  -- Urine culture showed 100,000 cfu/ml Gram Negative Ej Enteric Like Abnormal    ID recommended 4 weeks of ertapenem  Patient getting PICC line today    * Prostatitis  Assessment & Plan  --Hx of   --Currently symptomatic  --Had an MDRO (E.coli at [facility] recently treated with Ertapenem  -- Appreciate urology and ID  Getting PICC line today as per ID recommendations  Blood cultures negative for 24 hours               VTE Pharmacologic Prophylaxis: VTE Score: 2 Moderate Risk (Score 3-4) - Pharmacological DVT Prophylaxis Ordered: heparin.    Mobility:   Basic Mobility Inpatient Raw Score: 24  JH-HLM Goal: 8: Walk 250 feet or more  JH-HLM Achieved: 7: Walk 25 feet or more  JH-HLM Goal achieved. Continue to encourage appropriate mobility.    Patient Centered Rounds: I performed bedside rounds with nursing staff today.   Discussions with Specialists or Other Care Team Provider: ID    Education and Discussions with Family / Patient:  Updated patient.       Current Length of Stay: 0 day(s)  Current Patient Status: Observation   Certification Statement: The patient will continue to require additional inpatient hospital stay due to complicated UTI/prostatitis  Discharge Plan: Anticipate discharge tomorrow to home with home services.    Code Status: Level 1 - Full Code    Subjective:   Patient is feeling much better.  Does not have dysuric symptoms  No chest pain, shortness of breath    Objective:     Vitals:   Temp (24hrs), Av.1 °F (36.7 °C), Min:98.1 °F (36.7 °C), Max:98.2 °F (36.8 °C)    Temp:   [98.1 °F (36.7 °C)-98.2 °F (36.8 °C)] 98.1 °F (36.7 °C)  HR:  [49-55] 49  Resp:  [17-18] 17  BP: (127-128)/(76-82) 127/76  SpO2:  [91 %-96 %] 91 %  Body mass index is 28.69 kg/m².     Input and Output Summary (last 24 hours):     Intake/Output Summary (Last 24 hours) at 8/22/2024 1143  Last data filed at 8/22/2024 0804  Gross per 24 hour   Intake 720 ml   Output 850 ml   Net -130 ml       Physical Exam:   Constitutional: no Acute distress  HEENT: no Pallor or icterus  CVS: S1 plus S2  Respiratory: Normal vascular breathe without crackles and wheeze  Gastroenterology: Soft nontender without any palpable mass  Skin: No bruises or ecchymosis  Neurology: No focal logical deficit     Additional Data:     Labs:  Results from last 7 days   Lab Units 08/20/24  2207   WBC Thousand/uL 5.82   HEMOGLOBIN g/dL 11.4*   HEMATOCRIT % 35.2*   PLATELETS Thousands/uL 263   SEGS PCT % 37*   LYMPHO PCT % 45*   MONO PCT % 11   EOS PCT % 6     Results from last 7 days   Lab Units 08/22/24  0505   SODIUM mmol/L 140   POTASSIUM mmol/L 3.8   CHLORIDE mmol/L 106   CO2 mmol/L 29   BUN mg/dL 10   CREATININE mg/dL 1.17   ANION GAP mmol/L 5   CALCIUM mg/dL 9.1   ALBUMIN g/dL 3.4*   TOTAL BILIRUBIN mg/dL 0.48   ALK PHOS U/L 107*   ALT U/L 26   AST U/L 19   GLUCOSE RANDOM mg/dL 88     Results from last 7 days   Lab Units 08/20/24 2207   INR  1.00             Results from last 7 days   Lab Units 08/20/24 2207   LACTIC ACID mmol/L 1.5   PROCALCITONIN ng/ml 0.09       Lines/Drains:  Invasive Devices       Peripheral Intravenous Line  Duration             Peripheral IV 08/20/24 Left;Dorsal (posterior) Forearm 1 day                          Imaging: No pertinent imaging reviewed.    Recent Cultures (last 7 days):   Results from last 7 days   Lab Units 08/20/24 2214 08/20/24 2207 08/20/24 2137   BLOOD CULTURE  No Growth at 24 hrs. No Growth at 24 hrs.  --    URINE CULTURE   --   --  >100,000 cfu/ml Gram Negative Ej Enteric Like*       Last 24 Hours  Medication List:   Current Facility-Administered Medications   Medication Dose Route Frequency Provider Last Rate    acetaminophen  650 mg Oral Q6H PRN Doyle Spain MD      ertapenem  1,000 mg Intravenous Q24H London Dickerson MD 1,000 mg (08/21/24 4041)    heparin (porcine)  5,000 Units Subcutaneous Q8H [facility] London Dickerson MD      meclizine  12.5 mg Oral Q8H PRN Doyle Spain MD      phenazopyridine  200 mg Oral TID With Meals Doyle Spain MD      polyethylene glycol  17 g Oral Daily PRN Doyle Spain MD      tamsulosin  0.4 mg Oral HS Alyson Reardon PA-C      traMADol  50 mg Oral Q6H PRN Doyle Spain MD          Today, Patient Was Seen By: London Dickerson MD    **Please Note: This note may have been constructed using a voice recognition system.**

## 2024-08-22 NOTE — PROGRESS NOTES
Progress Note - Infectious Disease   Rony Johnson 64 y.o. male MRN: [id number]  Unit/Bed#: -01 Encounter: [id number]      Impression/Recommendations:  1.  Probable ESBL producing E. coli prostatitis, as complication of transrectal biopsy, symptomatically improved but not resolved after 10-day course of IV ertapenem, now with relapsing symptoms.  His urinary symptoms are improved on IV ertapenem again.  Abdomen/pelvis CT is without prostate abscess.  Given likely prostatitis clinically and given relapse with a short course of antibiotic, patient will need a longer course of antibiotic this time around.  Unfortunately, his ESBL producing E. coli isolate is not susceptible to any p.o. antibiotic.  Continue IV ertapenem.  Treat x 4 week antibiotic course this time around, through 9/16.  If urinary symptoms are not resolved completely at end of treatment, may need to extend to 6 week course.  With low probability of recurrent E. coli bacteremia, okay for PICC placement anytime.     2.  Recent ESBL producing E. coli bacteremia, secondary to prostatitis.  He completed treatment for this indication.  Repeat blood cultures here have no growth thus far.  Follow-up on admission blood cultures.     3.  Prostate cancer, currently under surveillance, with serial biopsies.  At present, there is no concrete plan for any surgery, chemotherapy or XRT.  [facility]  Urology follow-up.     Discussed with patient in detail regarding the above plan.  Discussed with Dr. Dickerson from [facility] service regarding antibiotic plan above and ID clearance for discharge.  He is in agreement.  Rx on chart for home IV antibiotic.  Follow-up with us 2 weeks after discharge.    Antibiotics:  Ertapenem # 2    Subjective:  Patient feels well.  Urinary symptoms improving.  No abdominal or flank pain.  Temperature stays down.  No chills.  He is tolerating antibiotic well.  No nausea, vomiting or  diarrhea.    Objective:  Vitals:  Temp:  [98.1 °F (36.7 °C)-98.2 °F (36.8 °C)] 98.1 °F (36.7 °C)  HR:  [49-55] 49  Resp:  [17-18] 17  BP: (127-128)/(76-82) 127/76  SpO2:  [91 %-96 %] 91 %  Temp (24hrs), Av.1 °F (36.7 °C), Min:98.1 °F (36.7 °C), Max:98.2 °F (36.8 °C)  Current: Temperature: 98.1 °F (36.7 °C)    Physical Exam:     General: Awake, alert, cooperative, no distress.   Neck:  Supple. No mass.  No lymphadenopathy.   Lungs: Expansion symmetric, no rales, no wheezing, respirations unlabored.   Heart:  Regular rate and rhythm, S1 and S2 normal, no murmur.   Abdomen: Soft, nondistended, non-tender, bowel sounds active all four quadrants, no masses, no organomegaly.   Extremities: No edema. No erythema/warmth. No ulcer. Nontender to palpation.   Skin:  No rash.   Neuro: Moves all extremities.     Invasive Devices       Peripherally Inserted Central Catheter Line  Duration             PICC Line 24 <1 day              Peripheral Intravenous Line  Duration             Peripheral IV 24 Left;Dorsal (posterior) Forearm 1 day                    Labs studies:   I have personally reviewed pertinent labs.  Results from last 7 days   Lab Units 24  0505 24  0646 24  2207   POTASSIUM mmol/L 3.8 3.6 3.6   CHLORIDE mmol/L 106 106 106   CO2 mmol/L 29 29 26   BUN mg/dL 10 9 11   CREATININE mg/dL 1.17 1.10 1.10   EGFR ml/min/1.73sq m 66 71 71   CALCIUM mg/dL 9.1 9.0 9.3   AST U/L 19 19 22   ALT U/L 26 26 29   ALK PHOS U/L 107* 109* 120*     Results from last 7 days   Lab Units 24  2207   WBC Thousand/uL 5.82   HEMOGLOBIN g/dL 11.4*   PLATELETS Thousands/uL 263     Results from last 7 days   Lab Units 24  2214 24  213   BLOOD CULTURE  No Growth at 24 hrs. No Growth at 24 hrs.  --    URINE CULTURE   --   --  >100,000 cfu/ml Gram Negative Ej Enteric Like*       Imaging Studies:   I have personally reviewed pertinent imaging study reports and images in PACS.    EKG,  Pathology, and Other Studies:   I have personally reviewed pertinent reports.

## 2024-08-22 NOTE — ASSESSMENT & PLAN NOTE
Hx elevated PSA, underwent prostate biopsy last month complicated by ESBL E.coli bacteremia, completed ertapenem course. Presenting with worsening dysuria, frequency, urgency  Admission on CT Enlarged prostate with mild adjacent inflammatory stranding. No abscess.   VSS, afebrile  UA gram negative rods, likely e coli  No leukocytosis  BC no growth  Patient started on ertapenem   ID following, ertapenem for 4 weeks   Outpatient follow up with primary urologist   Urology will sign off.

## 2024-08-23 VITALS
RESPIRATION RATE: 18 BRPM | SYSTOLIC BLOOD PRESSURE: 104 MMHG | HEART RATE: 84 BPM | WEIGHT: 205.69 LBS | HEIGHT: 71 IN | DIASTOLIC BLOOD PRESSURE: 66 MMHG | OXYGEN SATURATION: 96 % | TEMPERATURE: 98.2 F | BODY MASS INDEX: 28.8 KG/M2

## 2024-08-23 PROCEDURE — 99239 HOSP IP/OBS DSCHRG MGMT >30: CPT | Performed by: STUDENT IN AN ORGANIZED HEALTH CARE EDUCATION/TRAINING PROGRAM

## 2024-08-23 RX ORDER — PHENAZOPYRIDINE HYDROCHLORIDE 95 MG/1
95 TABLET ORAL 3 TIMES DAILY PRN
Qty: 10 TABLET | Refills: 0 | Status: SHIPPED | OUTPATIENT
Start: 2024-08-23

## 2024-08-23 RX ADMIN — HEPARIN SODIUM 5000 UNITS: 5000 INJECTION INTRAVENOUS; SUBCUTANEOUS at 13:29

## 2024-08-23 RX ADMIN — PHENAZOPYRIDINE 200 MG: 100 TABLET ORAL at 11:12

## 2024-08-23 RX ADMIN — PHENAZOPYRIDINE 200 MG: 100 TABLET ORAL at 08:29

## 2024-08-23 NOTE — PLAN OF CARE
Problem: GENITOURINARY - ADULT  Goal: Maintains or returns to baseline urinary function  Description: INTERVENTIONS:  - Assess urinary function  - Encourage oral fluids to ensure adequate hydration if ordered  - Administer IV fluids as ordered to ensure adequate hydration  - Administer ordered medications as needed  - Offer frequent toileting  - Follow urinary retention protocol if ordered  8/23/2024 1624 by Irma Mendiola  Outcome: Adequate for Discharge  8/23/2024 0915 by Irma Mendiola  Outcome: Progressing  Goal: Absence of urinary retention  Description: INTERVENTIONS:  - Assess patient’s ability to void and empty bladder  - Monitor I/O  - Bladder scan as needed  - Discuss with physician/AP medications to alleviate retention as needed  - Discuss catheterization for long term situations as appropriate  8/23/2024 1624 by Irma Mendiola  Outcome: Adequate for Discharge  8/23/2024 0915 by Irma Mendiola  Outcome: Progressing  Goal: Urinary catheter remains patent  Description: INTERVENTIONS:  - Assess patency of urinary catheter  - If patient has a chronic rodriges, consider changing catheter if non-functioning  - Follow guidelines for intermittent irrigation of non-functioning urinary catheter  8/23/2024 1624 by Irma Mendiola  Outcome: Adequate for Discharge  8/23/2024 0915 by Irma Mendiola  Outcome: Progress

## 2024-08-23 NOTE — DISCHARGE SUMMARY
Medical Problems       Resolved Problems  Date Reviewed: 8/21/2024   None       Discharging Physician / Practitioner: London Dickerson MD  PCP: Laverne Miranda MD  Admission Date:   Admission Orders (From admission, onward)       Ordered        08/22/24 1328  INPATIENT ADMISSION  Once            08/21/24 0158  Place in Observation  Once                          Discharge Date: 08/23/24    Consultations During Hospital Stay:  Infectious disease, urology    Procedures Performed:   PICC line    Significant Findings / Test Results:   CT abdomen pelvis with contrast   Final Result      Enlarged prostate with mild adjacent inflammatory stranding, which may be related to recent biopsy or ongoing prostatitis (the patient was recently treated for prostatitis at another institution). No abscess.      Otherwise no acute findings in the abdomen or pelvis.      Findings are consistent with the preliminary report from [facility] which was provided shortly after completion of the exam.         Workstation performed: [id number]               Incidental Findings:   none    Test Results Pending at Discharge (will require follow up):   none     Outpatient Tests Requested:  none    Complications:  none    Reason for Admission: Probable ESBL producing E. coli prostatitis     Hospital Course:   Rony Johnson is a 64 y.o. male patient who originally presented to the hospital on 8/20/2024 due to lower abdominal pain and dysuric symptom.  ID consulted and patient suspected to have probable ESBL producing E. coli prostatitis recommended 4 weeks of IV ertapenem.  Patient got PICC line.  If patient's symptoms does not resolve completely by the end of IV antibiotics at 4 weeks then patient can extend to 6 weeks course.  Patient needs to have follow-up with urology and ID.  During hospital stay patient had intermittent asymptomatic bradycardia.  Patient has been counseled and give him cardiology follow-up for the  "evaluation.    Condition at Discharge: good    Discharge Day Visit / Exam:   Subjective: Patient is sitting comfortably in the chair without any cardiorespiratory stress  Vitals: Blood Pressure: 113/74 (08/23/24 0729)  Pulse: 64 (08/23/24 0729)  Temperature: 98.2 °F (36.8 °C) (08/23/24 0729)  Temp Source: Oral (08/22/24 1516)  Respirations: 18 (08/23/24 0729)  Height: 5' 11\" (180.3 cm) (08/21/24 0240)  Weight - Scale: 93.3 kg (205 lb 11 oz) (08/21/24 0240)  SpO2: 98 % (08/23/24 0729)  Exam:   Constitutional: no Acute distress  HEENT: no Pallor or icterus  CVS: S1 plus S2  Respiratory: Normal vascular breathe without crackles and wheeze  Gastroenterology: Soft nontender without any palpable mass  Skin: No bruises or ecchymosis  Neurology: No focal logical deficit     Discussion with Family:  Updated patient.     Discharge instructions/Information to patient and family:   See after visit summary for information provided to patient and family.      Provisions for Follow-Up Care:  See after visit summary for information related to follow-up care and any pertinent home health orders.      Mobility at time of Discharge:   Basic Mobility Inpatient Raw Score: 24  JH-HLM Goal: 8: Walk 250 feet or more  JH-HLM Achieved: 7: Walk 25 feet or more  HLM Goal achieved. Continue to encourage appropriate mobility.     Disposition:   Home with [facility] (Reminder: Complete face to face encounter)    Planned Readmission: none     Discharge Statement:  I spent 37 minutes discharging the patient. This time was spent on the day of discharge. I had direct contact with the patient on the day of discharge. Greater than 50% of the total time was spent examining patient, answering all patient questions, arranging and discussing plan of care with patient as well as directly providing post-discharge instructions.  Additional time then spent on discharge activities.    Discharge Medications:  See after visit summary for reconciled discharge " medications provided to patient and/or family.      **Please Note: This note may have been constructed using a voice recognition system**

## 2024-08-23 NOTE — DISCHARGE INSTR - AVS FIRST PAGE
Please get  4 weeks of IV antibiotic (ertapenem )course this time around, through 9/16. If urinary symptoms are not resolved completely at end of treatment, may need to extend to 6 week course.  Please follow-up with PCP as per schedule  Please follow-up with cardiology for asymptomatic intermittent bradycardia.  Please follow-up with urology and ID

## 2024-08-23 NOTE — CASE MANAGEMENT
Case Management Discharge Planning Note    Patient name Rony Johnson  Location /-01 MRN [id number]  : 1960 Date 2024       Current Admission Date: 2024  Current Admission Diagnosis:Prostatitis   Patient Active Problem List    Diagnosis Date Noted Date Diagnosed    Prostatitis 2024     UTI (urinary tract infection) 2024     Bradycardia 2024     Prostate cancer (HCC) 10/31/2019     Family history of prostate cancer in father 10/31/2019     Urethral meatal stenosis 10/31/2019       LOS (days): 1  Geometric Mean LOS (GMLOS) (days): 3.5  Days to GMLOS:2.4     OBJECTIVE:  Risk of Unplanned Readmission Score: 10.91         Current admission status: Inpatient   Preferred Pharmacy:   [id number] - [place] - [address]  [address]  [place] [address]  Phone: [phone] Fax: [phone]    Primary Care Provider: Laverne Miranda MD    Primary Insurance: [facility]  Secondary Insurance:     DISCHARGE DETAILS:    Requested Home Health Care         Is the patient interested in HHC at discharge?: Yes  Home Health Discipline requested:: Nursing  Home Health Agency Name::  ([facility])  HHA External Referral Reason (only applicable if external HHA name selected): Patient has established relationship with provider  Home Health Follow-Up Provider:: LOGAN  Home Health Services Needed:: [facility], Administration of IV, IM or SC Medications  Homebound Criteria Met:: Uses an Assist Device (i.e. cane, walker, etc), Requires the Assistance of Another Person for Safe Ambulation or to Leave the Home  Supporting Clincal Findings:: Limited Endurance, Fatigues Easliy in Short Distances     Treatment Team Recommendation: Home with Home Health Care  Discharge Destination Plan:: Home with Home Health Care

## 2024-08-23 NOTE — CASE MANAGEMENT
Case Management Discharge Planning Note    Patient name Rony Johnson  Location /-01 MRN [id number]  : 1960 Date 2024       Current Admission Date: 2024  Current Admission Diagnosis:Prostatitis   Patient Active Problem List    Diagnosis Date Noted Date Diagnosed    Prostatitis 2024     UTI (urinary tract infection) 2024     Bradycardia 2024     Prostate cancer (HCC) 10/31/2019     Family history of prostate cancer in father 10/31/2019     Urethral meatal stenosis 10/31/2019       LOS (days): 1  Geometric Mean LOS (GMLOS) (days): 3.5  Days to GMLOS:2.5     OBJECTIVE:  Risk of Unplanned Readmission Score: 10.91         Current admission status: Inpatient   Preferred Pharmacy:   [id number] - [place] - [address]  [address]  [place] [address]  Phone: [phone] Fax: [phone]    Primary Care Provider: Laverne Miranda MD    Primary Insurance: [facility]  Secondary Insurance:     DISCHARGE DETAILS:  [facility] Martin[facility] and spoke with Holly regarding getting PCP to place order for home IV Abx via PICC line that was placed here at [facility]. MARY also contacted [facility] via [employer] and via phone call and spoke with Jennifer who has received documents uploaded through [employer]. Jennifer will contact CM as soon as PCP places order and all parties are on board with approval for patient discharge. MARY subsequently received a call back from [facility] Martin placed order so patient should be cleared for discharge soon after Jennifer confirms with MARY.    MARY spoke with patient to update him on discharge process. Patient is awaiting finalization of approval.

## 2024-08-25 LAB
BACTERIA BLD CULT: NORMAL
BACTERIA BLD CULT: NORMAL
BACTERIA UR CULT: ABNORMAL

## 2024-08-26 DIAGNOSIS — A49.9 ESBL (EXTENDED SPECTRUM BETA-LACTAMASE) PRODUCING BACTERIA INFECTION: ICD-10-CM

## 2024-08-26 DIAGNOSIS — N41.9 PROSTATITIS: Primary | ICD-10-CM

## 2024-08-26 DIAGNOSIS — Z16.12 ESBL (EXTENDED SPECTRUM BETA-LACTAMASE) PRODUCING BACTERIA INFECTION: ICD-10-CM

## 2024-08-26 LAB
BACTERIA BLD CULT: NORMAL
BACTERIA BLD CULT: NORMAL

## 2024-08-26 NOTE — PROGRESS NOTES
OPAT NOTE    Supervising/Discharge physician: Aga     Diagnosis: probable ESBL producing E. Coli prostatitis     Drug: Ertapenem     Dose/Route/Frequency: [id number]    Labs/Frequency: CBCD CMP weekly     End Date: 9/16    Infusion/VNA/SNF contact: [facility]    Next appointment: 9/4        MA assigned:  janet

## 2024-08-27 ENCOUNTER — TELEPHONE (OUTPATIENT)
Age: 64
End: 2024-08-27

## 2024-09-04 ENCOUNTER — TELEPHONE (OUTPATIENT)
Dept: INFECTIOUS DISEASES | Facility: CLINIC | Age: 64
End: 2024-09-04

## 2024-09-04 ENCOUNTER — OFFICE VISIT (OUTPATIENT)
Dept: INFECTIOUS DISEASES | Facility: CLINIC | Age: 64
End: 2024-09-04
Payer: COMMERCIAL

## 2024-09-04 VITALS
HEART RATE: 73 BPM | TEMPERATURE: 96.9 F | BODY MASS INDEX: 28.42 KG/M2 | OXYGEN SATURATION: 97 % | HEIGHT: 71 IN | SYSTOLIC BLOOD PRESSURE: 116 MMHG | DIASTOLIC BLOOD PRESSURE: 70 MMHG | WEIGHT: 203 LBS

## 2024-09-04 DIAGNOSIS — N41.0 ACUTE BACTERIAL PROSTATITIS: Primary | ICD-10-CM

## 2024-09-04 DIAGNOSIS — A49.8 INFECTION DUE TO ESBL-PRODUCING ESCHERICHIA COLI: ICD-10-CM

## 2024-09-04 DIAGNOSIS — Z16.12 INFECTION DUE TO ESBL-PRODUCING ESCHERICHIA COLI: ICD-10-CM

## 2024-09-04 PROCEDURE — 99215 OFFICE O/P EST HI 40 MIN: CPT | Performed by: INTERNAL MEDICINE

## 2024-09-04 RX ORDER — DIPHENOXYLATE HYDROCHLORIDE AND ATROPINE SULFATE 2.5; .025 MG/1; MG/1
1 TABLET ORAL DAILY
COMMUNITY
Start: 2024-08-02 | End: 2025-08-02

## 2024-09-04 NOTE — PROGRESS NOTES
Outpatient Progress Note - Infectious Disease   Rony Johnson 64 y.o. male MRN: [id number]  Unit/Bed#:  Encounter: [id number]      Impression/Plan:    Prostatitis due to ESBL E. Coli  History of E coli bacteremia:  Patient had an episode of E. coli bacteremia probably related to transrectal prostate biopsy in July.  This was followed by E. coli bacteremia and UTI treated treated with 14 days of ertapenem at [facility].  He was then hospitalized on 8/20 with recurrent UTI, presumed related to prostatitis given relapse.  CT abdomen pelvis did not clearly show an abscess, though there was some periprostatic fat stranding.  He was seen by Dr. Yung and is currently receiving 4 to 6-week course of IV ertapenem.  Today he reports improvement in his symptoms overall, with some residual discomfort with urination.  - I personally reviewed the most recent labs from 8/30/24.  Cr 1.1, GFR 76, ALT 59, WBC 4.4  - Continue IV ertapenem 1 g daily; given his continued symptoms albeit improved, I would favor extending for the 6-week duration ending 9/30/2024  - Will monitor LFTs, this is the first time ALT was elevated; if symptoms are significantly improved and LFTs are continuing to increase, we may be able to stop treatment at 4 weeks (9/16)  - Continue to follow CBC with differential and CMP weekly to monitor for medication toxicity  - PICC to be removed following the last dose of antibiotics      Prostate cancer undergoing active surveillance  Patient is undergoing surveillance with serial biopsies.   This likely led to #2, and patient is currently undergoing treatment for related prostatitis.    4.  Elevated ALT  Noted on most recent labs.  Could be related to antibiotics.  Asymptomatic.  - Will continue to monitor as above.    Summary:  Rony Johnson is a 64 y.o. male with PMH of prostate cancer undergoing surveillance who was recently seen for an ESBL E. coli prostatitis with bacteremia after transrectal prostate biopsy.  He  "is currently on treatment for prostatitis and presents today to follow-up.      Antibiotics:  Ertapenem 1 g daily    Subjective:  Today Mr. Johnson reports doing well.  When he was in the hospital last month, he noted fevers, chills, dysuria, and significant abdominal pain.  Now, his pain is resolved.  He is no longer having fevers, chills, or rigors.  He is taking Pyridium for some dysuria which is still lingering.  He feels his urine flow is better, and is taking tamsulosin.  He also had some perineal pain previously which has now also subsided.  He denies nausea, vomiting, rashes, diarrhea.  Right upper extremity PICC flushes easily.  He denies redness or irritation at the skin site or leakage.      ROS:  A complete review of systems is negative other than that noted above in the subjective    Followup portions patient history reviewed and updated as:  Allergies, current medications, past medical history, past social history, past surgical history, and the problem list    Objective:  Vitals:  Vitals:    09/04/24 1100   BP: 116/70   BP Location: Left arm   Pulse: 73   Temp: (!) 96.9 °F (36.1 °C)   SpO2: 97%   Weight: 92.1 kg (203 lb)   Height: 5' 11\" (1.803 m)       Physical Exam:   General Appearance:  Alert, interactive, appearing well, nontoxic, no acute distress.   Throat: Oropharynx moist without lesions.    Lungs:   Even unlabored respirations   Abdomen:   Soft, non-tender, non-distended.     Extremities: No clubbing, cyanosis or edema   Skin: No new rashes or lesions.  RUE PICC clean and dressed, no erythema or drainage.       Labs, Imaging, & Other studies:   All pertinent labs and imaging studies were personally reviewed    Labs:  Lab Results   Component Value Date    K 3.8 08/22/2024     08/22/2024    CO2 29 08/22/2024    BUN 10 08/22/2024    CREATININE 1.17 08/22/2024    GLUF 88 08/22/2024    CALCIUM 9.1 08/22/2024    CORRECTEDCA 9.6 08/22/2024    AST 19 08/22/2024    ALT 26 08/22/2024    ALKPHOS " "107 (H) 08/22/2024    EGFR 66 08/22/2024     Lab Results   Component Value Date    WBC 5.82 08/20/2024    HGB 11.4 (L) 08/20/2024    HCT 35.2 (L) 08/20/2024    MCV 91 08/20/2024     08/20/2024   No results found for: \"SEDRATE\"    Micro:        Imaging:  CTAP 8/20/24  I personally reviewed the reports and images in PACS.  Enlarged prostate with no clear abscess.  No hydronephrosis or nephrolithiasis.    Celio Jacobsen MD  [facility]   "

## 2024-09-04 NOTE — TELEPHONE ENCOUNTER
Confirmed with Ed at [facility] regarding extension to 9/30. They do not need a formal order. They will send one to us to be signed.

## 2024-09-05 NOTE — TELEPHONE ENCOUNTER
Patient called in stating that [facility] told him that they need us to fax a doctors order indication the EOT in regards to patients PICC line. Please advise.    Fax- [phone]

## 2024-09-17 ENCOUNTER — TELEMEDICINE (OUTPATIENT)
Dept: INFECTIOUS DISEASES | Facility: CLINIC | Age: 64
End: 2024-09-17
Payer: COMMERCIAL

## 2024-09-17 DIAGNOSIS — R78.81 BACTEREMIA: ICD-10-CM

## 2024-09-17 DIAGNOSIS — N41.9 PROSTATITIS: Primary | ICD-10-CM

## 2024-09-17 DIAGNOSIS — C61 PROSTATE CANCER (HCC): ICD-10-CM

## 2024-09-17 PROCEDURE — 99214 OFFICE O/P EST MOD 30 MIN: CPT | Performed by: PHYSICIAN ASSISTANT

## 2024-09-17 NOTE — ASSESSMENT & PLAN NOTE
Probable ESBL producing E. coli prostatitis, as complication of transrectal biopsy, symptomatically improved but not resolved after 10-day course of IV ertapenem, now with relapsing symptoms. His urinary symptoms are improved on IV ertapenem again. Abdomen/pelvis CT is without prostate abscess. Given likely prostatitis clinically and given relapse with a short course of antibiotic, patient will need a longer course of antibiotic this time around. Unfortunately, his ESBL producing E. coli isolate is not susceptible to any p.o. antibiotic.     Patient doing well on the IV ertapenem.  Urinary symptoms much improved.  No new complaints.  Labs stable but I do note a drop in the ANC to 1.2.  Will continue to monitor this closely.     Plan  - continue ertapenem 1 gram IV daily as ordered through 9/30 to complete a total of 6 weeks  - continue weekly CBCD, CMP while on IV ertapenem to monitor for antibiotic induced toxicity  - discontinue PICC on 10/1/24  - no further ID follow up scheduled at this time

## 2024-09-17 NOTE — PROGRESS NOTES
Ambulatory Visit  Name: Rony Johnson      : 1960      MRN: [id number]  Encounter Provider: Raj Garcia PA-C  Encounter Date: 2024   Encounter department: [facility]    Assessment & Plan  Prostatitis  Probable ESBL producing E. coli prostatitis, as complication of transrectal biopsy, symptomatically improved but not resolved after 10-day course of IV ertapenem, now with relapsing symptoms. His urinary symptoms are improved on IV ertapenem again. Abdomen/pelvis CT is without prostate abscess. Given likely prostatitis clinically and given relapse with a short course of antibiotic, patient will need a longer course of antibiotic this time around. Unfortunately, his ESBL producing E. coli isolate is not susceptible to any p.o. antibiotic.     Patient doing well on the IV ertapenem.  Urinary symptoms much improved.  No new complaints.  Labs stable but I do note a drop in the ANC to 1.2.  Will continue to monitor this closely.     Plan  - continue ertapenem 1 gram IV daily as ordered through  to complete a total of 6 weeks  - continue weekly CBCD, CMP while on IV ertapenem to monitor for antibiotic induced toxicity  - discontinue PICC on 10/1/24  - no further ID follow up scheduled at this time         Bacteremia  Recent ESBL producing E. coli bacteremia, secondary to prostatitis. He completed treatment for this indication. Repeat blood cultures here have no growth thus far.        Prostate cancer (HCC)  currently under surveillance, with serial biopsies. At present, there is no concrete plan for any surgery, chemotherapy or XRT. [facility]            History of Present Illness     Rony Johnson is a 64 y.o. male who presents for office follow up today regarding Probable ESBL producing E. coli prostatitis, as complication of transrectal biopsy, symptomatically improved but not resolved after 10-day course of IV ertapenem, now with relapsing  symptoms. Patient currently remains on IV ertapenem.  He continues to tolerate it well.  He has no new complaints today.  No issues with PICC.  Urinary symptoms essentially resolved.       Review of Systems   Constitutional:  Negative for chills and fever.   Respiratory:  Negative for cough and shortness of breath.    Gastrointestinal:  Negative for abdominal pain, diarrhea, nausea and vomiting.   Genitourinary:  Negative for dysuria.   Skin:  Negative for rash.   Psychiatric/Behavioral:  Negative for behavioral problems and confusion.            Objective     There were no vitals taken for this visit.    Physical Exam  Constitutional:       General: He is not in acute distress.     Appearance: Normal appearance. He is not ill-appearing, toxic-appearing or diaphoretic.   HENT:      Head: Normocephalic and atraumatic.   Eyes:      General: No scleral icterus.        Right eye: No discharge.         Left eye: No discharge.      Conjunctiva/sclera: Conjunctivae normal.   Pulmonary:      Effort: Pulmonary effort is normal. No respiratory distress.   Skin:     Coloration: Skin is not jaundiced or pale.      Findings: No erythema or rash.      Comments: PICC insertion site without erythema or drainage   Neurological:      Mental Status: He is alert.   Psychiatric:         Mood and Affect: Mood normal.         Behavior: Behavior normal.             Labs:   9/12/24  Wbc: 3.2  Hgb: 12.0  Plt: 234  ANC: 1.2  Cr: 1.2  AST: 31  ALT: 41  Alk phos: 124    Telemedicine consent    Patient: Rony Johnson  Provider: Raj Garcia PA-C  Provider located at [facility]  [address] [place] [id number] [phone]    The patient was identified by name and date of birth. Rony Johnson was informed that this is a telemedicine visit and that the visit is being conducted through the Epic Embedded platform. He agrees to proceed..  My office door was closed. No one else was in the  room.  He acknowledged consent and understanding of privacy and security of the video platform. The patient has agreed to participate and understands they can discontinue the visit at any time.    Patient is aware this is a billable service.     I spent 10 minutes with the patient during this visit.  Additional time spent on chart/lab review and documentation.

## 2024-09-17 NOTE — PATIENT INSTRUCTIONS
- continue ertapenem IV as ordered through 9/30  - weekly labs as ordered  - PICC removal 10/1  - no further ID follow up scheduled at this time  - please call with any concerns

## 2024-09-17 NOTE — ASSESSMENT & PLAN NOTE
currently under surveillance, with serial biopsies. At present, there is no concrete plan for any surgery, chemotherapy or XRT. [facility]

## 2024-09-19 ENCOUNTER — TELEPHONE (OUTPATIENT)
Dept: UROLOGY | Facility: CLINIC | Age: 64
End: 2024-09-19

## 2024-09-19 NOTE — TELEPHONE ENCOUNTER
[facility] Referral [facility] ( previous patient of Olimpia 2021)   DX: Acute prostatitis  Descision tree said within 4 weeks   Scheduled with Aracelis for 9/23/24  Sent reply back to VD to assign referral

## 2024-09-21 PROBLEM — N39.0 UTI (URINARY TRACT INFECTION): Status: RESOLVED | Noted: 2024-08-21 | Resolved: 2024-09-21

## 2024-09-23 ENCOUNTER — OFFICE VISIT (OUTPATIENT)
Dept: UROLOGY | Facility: CLINIC | Age: 64
End: 2024-09-23
Payer: COMMERCIAL

## 2024-09-23 VITALS
TEMPERATURE: 98.3 F | WEIGHT: 211 LBS | BODY MASS INDEX: 29.54 KG/M2 | HEIGHT: 71 IN | DIASTOLIC BLOOD PRESSURE: 76 MMHG | OXYGEN SATURATION: 98 % | SYSTOLIC BLOOD PRESSURE: 118 MMHG | HEART RATE: 71 BPM

## 2024-09-23 DIAGNOSIS — N41.0 ACUTE PROSTATITIS: ICD-10-CM

## 2024-09-23 DIAGNOSIS — C61 PROSTATE CANCER (HCC): Primary | ICD-10-CM

## 2024-09-23 PROCEDURE — 99214 OFFICE O/P EST MOD 30 MIN: CPT | Performed by: PHYSICIAN ASSISTANT

## 2024-09-23 NOTE — PROGRESS NOTES
9/23/2024      Chief Complaint   Patient presents with    Acute bacterial prostatitis         Assessment and Plan    Acute bacterial prostatitis   - Probable ESBL producing E. coli prostatitis, as complication of [facility]  - Abdomen/pelvis CT is without prostate abscess.   - Being managed by ID with Ertapenam 1 gm IV daily through 9/30  - Continue Flomax    2. Prostate cancer  - History of low risk, [facility] managed by [facility]  - [facility] - unsure of path results  - Prostate MRI 4/2024 - PIRADS 2.   - [facility] (8/2024)- increased metabolic activity in posterior prostate and increased metabolic activity in axillary region which may be reactive.   - Will need records regarding most recent pathology if wishing to transfer all prostate cancer care to [facility]. Will obtain tissue pathology and most recent PSAs. Should this show disease progression will need visit with MD for further discussion regarding definitive management. I did review option of Decipher testing to help determine if he is good candidate for ongoing active surveillance. He will think this over.   - Will scheduled follow up in 8-12 weeks with PSA and records prior to visit.       History of Present Illness  Rony Johnson is a 64 y.o. male here for follow up evaluation of recent prostatitis.     He previously did see our office in 2021. He has history of elevated PSA with prostate biopsy through the  in 2015 which should 2 cores of Vinicius 6 prostate cancer on the left side of prostate. Underwent transperineal prostate biopsy in 2020 with Dr. Doan which showed persistent low risk disease. He has been managed on active [facility]. Prostate MRI from April 2024 was negative, PIRADS 2. He underwent prostate [facility] in [facility] of this [facility]. [facility]ed initially on 7/26/24 to [facility] with AMS/confusion. Was diagnosed with metabolic  encephalopathy secondary to sepsis from ESBL E Coli bacteremia. He was treated with IV ertapenam. He then presented to [place] ED on 8/20/24 with abdominal pain and dysuria. He is being treated with additional course of IV ertapenem through PICC line. He is symptomatically feeling better. No fevers. He is unaware of most recent prostate biopsy results.     Review of Systems   Constitutional:  Negative for chills and fever.   Respiratory:  Negative for shortness of breath.    Cardiovascular:  Negative for chest pain.   Gastrointestinal:  Negative for abdominal pain.   Genitourinary:  Negative for difficulty urinating, dysuria, flank pain, frequency, hematuria and urgency.   Neurological:  Negative for dizziness.                  Past Medical History  Past Medical History:   Diagnosis Date    Arthritis     Cholecystolithiasis     Pneumonia        Past Social History  Past Surgical History:   Procedure Laterality Date    COLONOSCOPY      [place] PROSTATE NEEDLE BIOPSY ANY APPROACH N/A 2/21/2020    Procedure: TRANSRECTAL NEEDLE BIOPSY PROSTATE (TRNBP), *TRANSPERINEAL APPROACH;  Surgeon: Azael Doan MD;  Location: [facility];  Service: Urology    WISDOM TOOTH EXTRACTION       Social History     Tobacco Use   Smoking Status Never    Passive exposure: Past   Smokeless Tobacco Never       Past Family History  Family History   Problem Relation Age of Onset    Prostate cancer Father         2009    Diabetes Mother     Hypertension Mother        Past Social history  Social History     Socioeconomic History    Marital status: /Civil Union     Spouse name: Not on file    Number of children: Not on file    Years of education: Not on file    Highest education level: Not on file   Occupational History    Not on file   Tobacco Use    Smoking status: Never     Passive exposure: Past    Smokeless tobacco: Never   Vaping Use    Vaping status: Never Used   Substance and Sexual Activity    Alcohol use: Never    Drug use:  Never    Sexual activity: Not Currently   Other Topics Concern    Not on file   Social History Narrative    Not on file     Social Determinants of Health     Financial Resource Strain: Not on file   Food Insecurity: No Food Insecurity (8/21/2024)    Hunger Vital Sign     Worried About Running Out of Food in the Last Year: Never true     Ran Out of Food in the Last Year: Never true   Transportation Needs: No Transportation Needs (8/21/2024)    PRAPARE - Transportation     Lack of Transportation (Medical): No     Lack of Transportation (Non-Medical): No   Physical Activity: Not on file   Stress: Not on file   Social Connections: Unknown (6/18/2024)    Received from [employer]     How often do you feel lonely or isolated from those around you? (Adult - for ages 18 years and over): Not on file   Intimate Partner Violence: Not on file   Housing Stability: Low Risk  (8/21/2024)    Housing Stability Vital Sign     Unable to Pay for Housing in the Last Year: No     Number of Times Moved in the Last Year: 1     Homeless in the Last Year: No       Current Medications  Current Outpatient Medications   Medication Sig Dispense Refill    ERTAPENEM SODIUM IV Inject 1 g into a catheter in a vein in the morning      fluocinonide-emollient (LIDEX-E) 0.05 % cream Apply topically 2 (two) times a day (Patient taking differently: Apply topically if needed) 30 g 3    meclizine (ANTIVERT) 12.5 MG tablet TAKE TWO TABLETS BY MOUTH THREE TIMES A DAY FOR DIZZINESS      multivitamin (THERAGRAN) TABS Take 1 tablet by mouth daily      phenazopyridine (PYRIDIUM) 95 MG tablet Take 1 tablet (95 mg total) by mouth 3 (three) times a day as needed for bladder spasms 10 tablet 0    tamsulosin (FLOMAX) 0.4 mg Take 0.4 mg by mouth daily at bedtime      traMADol (ULTRAM) 50 mg tablet Take 1 tablet (50 mg total) by mouth every 6 (six) hours as needed for moderate pain for up to 5 doses (Patient not taking: Reported on 9/23/2024) 5  "tablet 0     No current facility-administered medications for this visit.       Allergies  No Active Allergies        The following portions of the patient's history were reviewed and updated as appropriate: allergies, current medications, past medical history, past social history, past surgical history and problem list.      Vitals  Vitals:    09/23/24 1256   BP: 118/76   Pulse: 71   Temp: 98.3 °F (36.8 °C)   TempSrc: Temporal   SpO2: 98%   Weight: 95.7 kg (211 lb)   Height: 5' 11\" (1.803 m)           Physical Exam  Physical Exam  Constitutional:       Appearance: Normal appearance.   HENT:      Head: Normocephalic and atraumatic.      Right Ear: External ear normal.      Left Ear: External ear normal.      Nose: Nose normal.   Eyes:      General: No scleral icterus.     Conjunctiva/sclera: Conjunctivae normal.   Cardiovascular:      Pulses: Normal pulses.   Pulmonary:      Effort: Pulmonary effort is normal.   Musculoskeletal:         General: Normal range of motion.      Cervical back: Normal range of motion.   Neurological:      General: No focal deficit present.      Mental Status: He is alert and oriented to person, place, and time.   Psychiatric:         Mood and Affect: Mood normal.         Behavior: Behavior normal.         Thought Content: Thought content normal.         Judgment: Judgment normal.           Results  No results found for this or any previous visit (from the past 1 hour(s)).]  Lab Results   Component Value Date    PSA 6.1 (H) 09/21/2020     Lab Results   Component Value Date    CALCIUM 9.1 08/22/2024    K 3.8 08/22/2024    CO2 29 08/22/2024     08/22/2024    BUN 10 08/22/2024    CREATININE 1.17 08/22/2024     Lab Results   Component Value Date    WBC 5.82 08/20/2024    HGB 11.4 (L) 08/20/2024    HCT 35.2 (L) 08/20/2024    MCV 91 08/20/2024     08/20/2024           Orders  Orders Placed This Encounter   Procedures    PSA Total, Diagnostic     Standing Status:   Future     " Standing Expiration Date:   9/23/2025       Aracelis Canseco

## 2024-09-24 ENCOUNTER — TELEPHONE (OUTPATIENT)
Dept: UROLOGY | Facility: CLINIC | Age: 64
End: 2024-09-24

## 2024-09-24 NOTE — TELEPHONE ENCOUNTER
Called patient and advised of message . Did also let him know that he would need to see a robotic surgeon at the [facility] for further  care . He is asking about the Pearl score of 7 , what exactly does that mean, what does this mean for him , surgery, etc? Did not answer , advise I would have the Ap call back or clinical -

## 2024-09-24 NOTE — TELEPHONE ENCOUNTER
Reviewed pathology from [facility]. Several cores noted to have [place] 3+4=7 prostate cancer. If wishing to switch all prostate cancer care to [facility] needs to see MD to discuss definitive prostate cancer treatment. Please cancel visit with me and schedule visit with MD if wishing to switch all care to [facility]

## 2024-09-24 NOTE — TELEPHONE ENCOUNTER
Called patient and unable to reach/leave voicemail. Ultimately this should have been reviewed by the urologist who performed the prostate biopsy. He needs further discussion with MD.

## 2024-09-24 NOTE — TELEPHONE ENCOUNTER
Provider asking for records of path report w/leighton score   [facility] - these were not included in latest records from them

## 2024-09-25 NOTE — TELEPHONE ENCOUNTER
LVM per cc informing pt of teddy Andre PA-C have been reviewed by the urologist who performed the prostate biopsy. He needs further discussion with MD.   If pt calls back please assist in scheduling pt

## 2024-09-26 ENCOUNTER — TELEPHONE (OUTPATIENT)
Age: 64
End: 2024-09-26

## 2024-09-26 NOTE — TELEPHONE ENCOUNTER
2nd attempt   LVM per cc informing pt of teddy Andre PA-C have been reviewed by the urologist who performed the prostate biopsy. He needs further discussion with MD.   If pt calls back please assist in scheduling pt

## 2024-09-26 NOTE — TELEPHONE ENCOUNTER
Jonah calls from [facility] and state that he wants to confirm pt's end date. Informed him end date is 9/30 ok to pull picc line on 10/1 per Last visit note.

## 2024-09-30 NOTE — TELEPHONE ENCOUNTER
Called and spoke with pt, informing pt I had scheduled him an appt with DR. Soto. Pt verbalized understanding and stated he will have to work around his wife schedule since she does not drive. I informed pt if he discuss with his wife and he is unable to make the appt to please call us back to reschedule. Pt verbalized understanding

## 2024-10-28 ENCOUNTER — TELEPHONE (OUTPATIENT)
Dept: UROLOGY | Facility: CLINIC | Age: 64
End: 2024-10-28

## 2024-11-12 ENCOUNTER — TELEPHONE (OUTPATIENT)
Dept: UROLOGY | Facility: CLINIC | Age: 64
End: 2024-11-12

## 2024-11-12 NOTE — TELEPHONE ENCOUNTER
Called and left VM for the PT per CC. PSA is needed PTV. Office number left for the PT if any questions.

## 2024-11-15 ENCOUNTER — APPOINTMENT (OUTPATIENT)
Dept: LAB | Facility: CLINIC | Age: 64
End: 2024-11-15
Payer: COMMERCIAL

## 2024-11-15 DIAGNOSIS — C61 PROSTATE CANCER (HCC): ICD-10-CM

## 2024-11-15 LAB — PSA SERPL-MCNC: 5.26 NG/ML (ref 0–4)

## 2024-11-15 PROCEDURE — 36415 COLL VENOUS BLD VENIPUNCTURE: CPT

## 2024-11-15 PROCEDURE — 84153 ASSAY OF PSA TOTAL: CPT

## 2024-11-15 NOTE — TELEPHONE ENCOUNTER
Patient called in stating he is at the lab now to get PSA blood work and asking if [facility]. Spoke with the lab while on the phone and informed her of [facility] referral that was on patient's file which states lab work correlated with referral. No further assistance needed.

## 2024-11-15 NOTE — TELEPHONE ENCOUNTER
Patient called in asking about his PSA blood work. I advised he have this completed today for his upcoming appointment on Monday. Patient states he will go this morning.

## 2024-12-03 NOTE — PROGRESS NOTES
Referring Physician: Mateus Whitman MD  A copy of this note was sent to the referring physician.       Diagnoses and all orders for this visit:    Prostate cancer (HCC)  -     Case request operating room: PROSTATECTOMY RADICAL LAPAROSCOPIC  W ROBOTICS; Standing  -     Case request operating room: PROSTATECTOMY RADICAL LAPAROSCOPIC  W ROBOTICS    Other orders  -     other medication, see sig,; Take by mouth daily Medication/product name:   Erika  Strength:   500mg  Sig (include dose, route, frequency):  -     Place sequential compression device; Standing  -     Nursing communication Patient instructions: Please drink 1 bottle 1 hour after dinner and 1 bottle 1 hour before bed on the night before your surgery. Please drink 1 hour before arriving to hospital for surgery; Standing  -     bupivacaine liposomal (EXPAREL) 1.3 % injection 20 mL  -     ceFAZolin (ANCEF) 2,000 mg in sodium chloride 0.9 % 50 mL IVPB            Assessment and plan:       1.  Prostate cancer with progression on active surveillance  -Originally diagnosed 2015  -Recent prostate biopsy  -Grade group 2, Vinicius 3+4 equal to disease in 5 cores, multiple additional positive cores of [place] 7  -Current PSA 5.2  -Patient underwent a staging PET scan for unclear indications, I have access to the hardcopy images but not the report  -Last MRI was in April 2024: No radiographic detectable disease, 36 g gland    2.  Recent episode of bacteremia [facility]      We discussed the natural history of prostate cancer, the Vinicius grading system, and the patient's current staging. He was provided with a copy of his biopsy report. We discussed the options for managing newly diagnosed prostate cancer including active surveillance for low risk disease, radical prostatectomy, and pelvic radiation therapy. We discussed each of these management strategies in detail, with particular emphasis to how they do or do not apply to the  patient's current staging. A discussion was guided using criteria for National Comprehensive Cancer Network (NCCN) to stratify patient's according to low, intermediate, or high-risk prostate cancer. We discussed the goals of care as #1 being cancer cure, and #2 being preservation of urinary control and erectile function.    With regards to surgical resection, we discussed the benefits and risks, including but not limited to bleeding which may or may not require blood transfusion, infection, injury to other structures (bladder, ureters, rectum, nerves, & vascular /neural structures), ileus, DVT/PE, MI, CVA, urinary incontinence, impotence, failure to completely eradicate the tumor/positive margins or the need for further therapy, and death. Risks of severe urinary incontinence necessitating an AUS 1-3% and minor stress urinary incontinence requiring pads about 10-15% at 1 year were also discussed. The risk of impotence was also discussed in great detail. After surgery, his libido should be unchanged, he may or may not have normal spontaneous erections but that he will have a dry orgasm. Post operative problems including the need for adjuvant therapy were discussed. He had the opportunity to ask questions and his questions were answered to his satisfaction.    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  He is an excellent candidate.  Informed consent today was obtained today and surgical be scheduled in the near future    He will require internal pathology review [facility]    I have spent 60 minutes with Patient  today in which greater than 50% of this time was spent in counseling/coordination of care regarding Diagnostic results, Prognosis, Risks and benefits of tx options, Counseling / Coordination of care, Reviewing / ordering tests, medicine, procedures  , and Obtaining or reviewing history  .    Please note this time includes cumulative time on the day of encounter, including  reviewing medical records and/or coordinating care among the patient's other specialists.     Leo Soto MD      Chief Complaint     Prostate cancer follow-up      History of Present Illness     Rony Johnson is a 64 y.o. returns in follow-up for prostate cancer    Detailed Urologic History     - please refer to HPI    Review of Systems     Review of Systems   Constitutional: Negative for activity change and fatigue.   HENT: Negative for congestion.    Eyes: Negative for visual disturbance.   Respiratory: Negative for shortness of breath and wheezing.    Cardiovascular: Negative for chest pain and leg swelling.   Gastrointestinal: Negative for abdominal pain.   Endocrine: Negative for polyuria.   Genitourinary: Negative for dysuria, flank pain, hematuria and urgency.   Musculoskeletal: Negative for back pain.   Allergic/Immunologic: Negative for immunocompromised state.   Neurological: Negative for dizziness and numbness.   Psychiatric/Behavioral: Negative for dysphoric mood.   All other systems reviewed and are negative.          Allergies     No Active Allergies    Physical Exam       Physical Exam  Constitutional:       General: He is not in acute distress.     Appearance: He is well-developed.   HENT:      Head: Normocephalic and atraumatic.   Cardiovascular:      Comments: Negative lower extremity edema  Pulmonary:      Effort: Pulmonary effort is normal.      Breath sounds: Normal breath sounds.   Abdominal:      Palpations: Abdomen is soft.   Musculoskeletal:         General: Normal range of motion.      Cervical back: Normal range of motion.   Skin:     General: Skin is warm.   Neurological:      Mental Status: He is alert and oriented to person, place, and time.   Psychiatric:         Behavior: Behavior normal.           Vital Signs  There were no vitals filed for this visit.      Current Medications       Current Outpatient Medications:     ERTAPENEM SODIUM IV, Inject 1 g into a catheter  in a vein in the morning, Disp: , Rfl:     fluocinonide-emollient (LIDEX-E) 0.05 % cream, Apply topically 2 (two) times a day (Patient taking differently: Apply topically if needed), Disp: 30 g, Rfl: 3    meclizine (ANTIVERT) 12.5 MG tablet, TAKE TWO TABLETS BY MOUTH THREE TIMES A DAY FOR DIZZINESS, Disp: , Rfl:     multivitamin (THERAGRAN) TABS, Take 1 tablet by mouth daily, Disp: , Rfl:     phenazopyridine (PYRIDIUM) 95 MG tablet, Take 1 tablet (95 mg total) by mouth 3 (three) times a day as needed for bladder spasms, Disp: 10 tablet, Rfl: 0    tamsulosin (FLOMAX) 0.4 mg, Take 0.4 mg by mouth daily at bedtime, Disp: , Rfl:     traMADol (ULTRAM) 50 mg tablet, Take 1 tablet (50 mg total) by mouth every 6 (six) hours as needed for moderate pain for up to 5 doses (Patient not taking: Reported on 9/23/2024), Disp: 5 tablet, Rfl: 0      Active Problems     Patient Active Problem List   Diagnosis    Prostate cancer (HCC)    Family history of prostate cancer in father    Urethral meatal stenosis    Prostatitis    Bradycardia         Past Medical History     Past Medical History:   Diagnosis Date    Arthritis     Cholecystolithiasis     Pneumonia          Surgical History     Past Surgical History:   Procedure Laterality Date    COLONOSCOPY      [place] PROSTATE NEEDLE BIOPSY ANY APPROACH N/A 2/21/2020    Procedure: TRANSRECTAL NEEDLE BIOPSY PROSTATE (TRNBP), *TRANSPERINEAL APPROACH;  Surgeon: Azael Doan MD;  Location: [facility];  Service: Urology    WISDOM TOOTH EXTRACTION           Family History     Family History   Problem Relation Age of Onset    Prostate cancer Father         2009    Diabetes Mother     Hypertension Mother          Social History     Social History     Social History     Tobacco Use   Smoking Status Never    Passive exposure: Past   Smokeless Tobacco Never         Pertinent Lab Values     Lab Results   Component Value Date    CREATININE 1.17 08/22/2024       Lab Results   Component Value  "Date    PSA 5.256 (H) 11/15/2024    PSA 6.1 (H) 09/21/2020       @RESULTRCNT(1H])@      Pertinent Imaging       No results found.      Portions of the record may have been created with voice recognition software.  Occasional wrong word or \"sound a like\" substitutions may have occurred due to the inherent limitations of voice recognition software.  In addition some of the content generated from this outpatient encounter includes information designed for patient education and/or communication back to the referring provider.  Read the chart carefully and recognize, using context, where substitutions have occurred.    "

## 2024-12-04 ENCOUNTER — OFFICE VISIT (OUTPATIENT)
Dept: UROLOGY | Facility: CLINIC | Age: 64
End: 2024-12-04
Payer: COMMERCIAL

## 2024-12-04 DIAGNOSIS — C61 PROSTATE CANCER (HCC): Primary | ICD-10-CM

## 2024-12-04 PROCEDURE — 99215 OFFICE O/P EST HI 40 MIN: CPT | Performed by: UROLOGY

## 2024-12-04 NOTE — PATIENT INSTRUCTIONS
Rony Johnson -    You have elected for a Robotic Prostatectomy to treat your prostate cancer.      In the next few days, you will receive a phone call from my surgical scheduler.  She will discuss selecting a date for surgery, and provide you with our preoperative surgical packet.  This includes information to help prepare you for surgery.    I encourage you to begin preparing for the procedure by watching our practice's preoperative educational videos here:     [email or web address]          Leo Soto MD,PhD  [facility]  Chief of Surgery, [facility]  [phone]

## 2024-12-17 ENCOUNTER — TELEPHONE (OUTPATIENT)
Dept: UROLOGY | Facility: AMBULATORY SURGERY CENTER | Age: 64
End: 2024-12-17

## 2024-12-18 ENCOUNTER — TELEPHONE (OUTPATIENT)
Dept: UROLOGY | Facility: MEDICAL CENTER | Age: 64
End: 2024-12-18

## 2024-12-18 ENCOUNTER — PREP FOR PROCEDURE (OUTPATIENT)
Dept: UROLOGY | Facility: MEDICAL CENTER | Age: 64
End: 2024-12-18

## 2024-12-18 DIAGNOSIS — Z01.812 PRE-OPERATIVE LABORATORY EXAMINATION: ICD-10-CM

## 2024-12-18 DIAGNOSIS — Z79.01 LONG TERM (CURRENT) USE OF ANTICOAGULANTS: ICD-10-CM

## 2024-12-18 DIAGNOSIS — C61 PROSTATE CANCER (HCC): Primary | ICD-10-CM

## 2024-12-18 DIAGNOSIS — Z01.818 OTHER SPECIFIED PRE-OPERATIVE EXAMINATION: ICD-10-CM

## 2024-12-18 DIAGNOSIS — Z01.810 PRE-OPERATIVE CARDIOVASCULAR EXAMINATION: ICD-10-CM

## 2024-12-18 DIAGNOSIS — R39.89 SUSPECTED UTI: ICD-10-CM

## 2024-12-18 NOTE — TELEPHONE ENCOUNTER
Spoke with patient and confirmed surgery date of 4/23/25  Type of surgery:  LEXX  Operating physician:Dr. Soto  Location of surgery: Per OR    Verbally went over prep with patient on   NPO  Bowel prep? Yes  Hospital calls afternoon prior with arrival time (calls Friday afternoon for Monday surgery)  Patient needs ride to and from surgery   outpatient with a 23 hour hold   Pre-op testing to be done 2 weeks prior to surgery. All testing can be done as a walk-in. EKG can only be done as a walk-in at any [facility].  (CBC, BMP, PT/INR, PTT, T/S, UCX, EKG  Blood thinners:   None  Clearances needed: Medical    Mailed to patient on 12/18/24  Copy of packet scanned into Media  Labs in packet and in electronic record   Soap and Bowel  prep in packet  pre and post-op in packet     Consent: will obtain at H&P     Medical Clearance:  Yes  [facility] with [facility]      RBC blood bank done on: 12/18/24

## 2024-12-20 NOTE — TELEPHONE ENCOUNTER
Received Prostate Biopsy pathology slides today and sent the to the [facility] Pathology Dept to be processed 12/20/2024. Will wait for report.

## 2025-01-02 ENCOUNTER — LAB REQUISITION (OUTPATIENT)
Dept: LAB | Facility: HOSPITAL | Age: 65
End: 2025-01-02
Payer: COMMERCIAL

## 2025-01-02 DIAGNOSIS — C61 MALIGNANT NEOPLASM OF PROSTATE (HCC): ICD-10-CM

## 2025-01-03 PROCEDURE — 88321 CONSLTJ&REPRT SLD PREP ELSWR: CPT | Performed by: PATHOLOGY

## 2025-04-03 NOTE — PRE-PROCEDURE INSTRUCTIONS
Pre-Surgery Instructions:   Medication Instructions    fluocinonide-emollient (LIDEX-E) 0.05 % cream Stop taking 1 day prior to surgery.    meclizine (ANTIVERT) 12.5 MG tablet Uses PRN- OK to take day of surgery    multivitamin (THERAGRAN) TABS Stop taking 7 days prior to surgery.    other medication, see sig, Stop taking 7 days prior to surgery.    phenazopyridine (PYRIDIUM) 95 MG tablet Hold day of surgery.    traMADol (ULTRAM) 50 mg tablet Uses PRN- OK to take day of surgery   Medication instructions for day of surgery reviewed. Please take all instructed medications with only a sip of water.       You will receive a call one business day prior to surgery with an arrival time and hospital directions. If your surgery is scheduled on a Monday, the hospital will be calling you on the Friday prior to your surgery. If you have not heard from anyone by 8pm, please call the hospital supervisor through the hospital  at [phone]. ([place] [phone] or [place] [phone]).    Do not eat or drink anything after midnight the night before your surgery, including candy, mints, lifesavers, or chewing gum. Do not drink alcohol 24hrs before your surgery. Try not to smoke at least 24hrs before your surgery.       Follow the pre surgery showering instructions as listed in the “My Surgical Experience Booklet” or otherwise provided by your surgeon's office. Do not use a blade to shave the surgical area 1 week before surgery. It is okay to use a clean electric clippers up to 24 hours before surgery. Do not apply any lotions, creams, including makeup, cologne, deodorant, or perfumes after showering on the day of your surgery. Do not use dry shampoo, hair spray, hair gel, or any type of hair products.     No contact lenses, eye make-up, or artificial eyelashes. Remove nail polish, including gel polish, and any artificial, gel, or acrylic nails if possible. Remove all jewelry including rings and body piercing jewelry.      Wear causal clothing that is easy to take on and off. Consider your type of surgery.    Keep any valuables, jewelry, piercings at home. Please bring any specially ordered equipment (sling, braces) if indicated.    Arrange for a responsible person to drive you to and from the hospital on the day of your surgery. Please confirm the visitor policy for the day of your procedure when you receive your phone call with an arrival time.     Call the surgeon's office with any new illnesses, exposures, or additional questions prior to surgery.    Please reference your “My Surgical Experience Booklet” for additional information to prepare for your upcoming surgery.  Prostate class completed. Started Incentive spirometer exercises

## 2025-04-09 ENCOUNTER — OFFICE VISIT (OUTPATIENT)
Dept: LAB | Facility: HOSPITAL | Age: 65
End: 2025-04-09
Payer: COMMERCIAL

## 2025-04-09 ENCOUNTER — APPOINTMENT (OUTPATIENT)
Dept: LAB | Facility: HOSPITAL | Age: 65
End: 2025-04-09
Payer: COMMERCIAL

## 2025-04-09 DIAGNOSIS — C61 PROSTATE CANCER (HCC): ICD-10-CM

## 2025-04-09 DIAGNOSIS — R39.89 SUSPECTED UTI: ICD-10-CM

## 2025-04-09 DIAGNOSIS — Z01.818 PRE-OP EXAM: ICD-10-CM

## 2025-04-09 DIAGNOSIS — Z01.818 OTHER SPECIFIED PRE-OPERATIVE EXAMINATION: ICD-10-CM

## 2025-04-09 DIAGNOSIS — Z01.810 PRE-OPERATIVE CARDIOVASCULAR EXAMINATION: ICD-10-CM

## 2025-04-09 DIAGNOSIS — Z01.812 PRE-OPERATIVE LABORATORY EXAMINATION: ICD-10-CM

## 2025-04-09 DIAGNOSIS — Z79.01 LONG TERM (CURRENT) USE OF ANTICOAGULANTS: ICD-10-CM

## 2025-04-09 LAB
ANION GAP SERPL CALCULATED.3IONS-SCNC: 8 MMOL/L (ref 4–13)
APTT PPP: 30 SECONDS (ref 23–34)
ATRIAL RATE: 48 BPM
BASOPHILS # BLD MANUAL: 0.04 THOUSAND/UL (ref 0–0.1)
BASOPHILS NFR MAR MANUAL: 1 % (ref 0–1)
BUN SERPL-MCNC: 12 MG/DL (ref 5–25)
CALCIUM SERPL-MCNC: 9.6 MG/DL (ref 8.4–10.2)
CHLORIDE SERPL-SCNC: 105 MMOL/L (ref 96–108)
CO2 SERPL-SCNC: 26 MMOL/L (ref 21–32)
CREAT SERPL-MCNC: 1.12 MG/DL (ref 0.6–1.3)
EOSINOPHIL # BLD MANUAL: 0.04 THOUSAND/UL (ref 0–0.4)
EOSINOPHIL NFR BLD MANUAL: 1 % (ref 0–6)
ERYTHROCYTE [DISTWIDTH] IN BLOOD BY AUTOMATED COUNT: 12.9 % (ref 11.6–15.1)
GFR SERPL CREATININE-BSD FRML MDRD: 69 ML/MIN/1.73SQ M
GLUCOSE P FAST SERPL-MCNC: 88 MG/DL (ref 65–99)
HCT VFR BLD AUTO: 41.7 % (ref 36.5–49.3)
HGB BLD-MCNC: 13.4 G/DL (ref 12–17)
INR PPP: 1.02 (ref 0.85–1.19)
LYMPHOCYTES # BLD AUTO: 1.82 THOUSAND/UL (ref 0.6–4.47)
LYMPHOCYTES # BLD AUTO: 47 % (ref 14–44)
MCH RBC QN AUTO: 29.8 PG (ref 26.8–34.3)
MCHC RBC AUTO-ENTMCNC: 32.1 G/DL (ref 31.4–37.4)
MCV RBC AUTO: 93 FL (ref 82–98)
MONOCYTES # BLD AUTO: 0.49 THOUSAND/UL (ref 0–1.22)
MONOCYTES NFR BLD: 13 % (ref 4–12)
NEUTROPHILS # BLD MANUAL: 1.4 THOUSAND/UL (ref 1.85–7.62)
NEUTS SEG NFR BLD AUTO: 37 % (ref 43–75)
P AXIS: 31 DEGREES
PLATELET # BLD AUTO: 165 THOUSANDS/UL (ref 149–390)
PLATELET BLD QL SMEAR: ADEQUATE
PMV BLD AUTO: 10 FL (ref 8.9–12.7)
POTASSIUM SERPL-SCNC: 3.7 MMOL/L (ref 3.5–5.3)
PR INTERVAL: 190 MS
PROTHROMBIN TIME: 14.1 SECONDS (ref 12.3–15)
QRS AXIS: 11 DEGREES
QRSD INTERVAL: 80 MS
QT INTERVAL: 448 MS
QTC INTERVAL: 401 MS
RBC # BLD AUTO: 4.5 MILLION/UL (ref 3.88–5.62)
SODIUM SERPL-SCNC: 139 MMOL/L (ref 135–147)
T WAVE AXIS: 38 DEGREES
VARIANT LYMPHS # BLD AUTO: 1 %
VENTRICULAR RATE: 48 BPM
WBC # BLD AUTO: 3.79 THOUSAND/UL (ref 4.31–10.16)

## 2025-04-09 PROCEDURE — 85007 BL SMEAR W/DIFF WBC COUNT: CPT

## 2025-04-09 PROCEDURE — 85610 PROTHROMBIN TIME: CPT

## 2025-04-09 PROCEDURE — 86850 RBC ANTIBODY SCREEN: CPT | Performed by: UROLOGY

## 2025-04-09 PROCEDURE — 86900 BLOOD TYPING SEROLOGIC ABO: CPT | Performed by: UROLOGY

## 2025-04-09 PROCEDURE — 87086 URINE CULTURE/COLONY COUNT: CPT

## 2025-04-09 PROCEDURE — 93005 ELECTROCARDIOGRAM TRACING: CPT

## 2025-04-09 PROCEDURE — 86901 BLOOD TYPING SEROLOGIC RH(D): CPT | Performed by: UROLOGY

## 2025-04-09 PROCEDURE — 36415 COLL VENOUS BLD VENIPUNCTURE: CPT

## 2025-04-09 PROCEDURE — 93010 ELECTROCARDIOGRAM REPORT: CPT | Performed by: INTERNAL MEDICINE

## 2025-04-09 PROCEDURE — 80048 BASIC METABOLIC PNL TOTAL CA: CPT

## 2025-04-09 PROCEDURE — 85027 COMPLETE CBC AUTOMATED: CPT

## 2025-04-09 PROCEDURE — 85730 THROMBOPLASTIN TIME PARTIAL: CPT

## 2025-04-10 ENCOUNTER — OFFICE VISIT (OUTPATIENT)
Dept: UROLOGY | Facility: CLINIC | Age: 65
End: 2025-04-10
Payer: COMMERCIAL

## 2025-04-10 ENCOUNTER — LAB REQUISITION (OUTPATIENT)
Dept: LAB | Facility: HOSPITAL | Age: 65
End: 2025-04-10
Payer: COMMERCIAL

## 2025-04-10 VITALS
DIASTOLIC BLOOD PRESSURE: 90 MMHG | BODY MASS INDEX: 28.84 KG/M2 | HEIGHT: 71 IN | WEIGHT: 206 LBS | OXYGEN SATURATION: 99 % | SYSTOLIC BLOOD PRESSURE: 124 MMHG | HEART RATE: 72 BPM

## 2025-04-10 DIAGNOSIS — Z01.818 ENCOUNTER FOR OTHER PREPROCEDURAL EXAMINATION: ICD-10-CM

## 2025-04-10 DIAGNOSIS — C61 PROSTATE CANCER (HCC): Primary | ICD-10-CM

## 2025-04-10 LAB
ABO GROUP BLD: NORMAL
BACTERIA UR CULT: NORMAL
BLD GP AB SCN SERPL QL: NEGATIVE
RH BLD: POSITIVE
SPECIMEN EXPIRATION DATE: NORMAL

## 2025-04-10 PROCEDURE — 99213 OFFICE O/P EST LOW 20 MIN: CPT | Performed by: PHYSICIAN ASSISTANT

## 2025-04-10 NOTE — PROGRESS NOTES
HISTORY AND PHYSICAL       Patient Identifiers: Rony Johnson (MRN: [id number])    Urology, Pavel Enriquez PA-C  Date of Service: 4/10/2025    History of Present Illness:     Rony Johnson is a 65 y.o. male with history of prostate cancer progression on active surveillance.  Originally diagnosed 2015.  He had a recent biopsy showing grade group 2 Vinicius 3+4 in 5 cores.  His last PSA was 5.2.  His last MRI showed no radiologically detectable disease.  He is scheduled for robotically assisted laparoscopic prostatectomy April 23.  Labs and urine culture are completed    Past Medical, Past Surgical History:     Past Medical History:   Diagnosis Date    Arthritis     Cholecystolithiasis     Pneumonia    :    Past Surgical History:   Procedure Laterality Date    COLONOSCOPY      [place] PROSTATE NEEDLE BIOPSY ANY APPROACH N/A 2/21/2020    Procedure: TRANSRECTAL NEEDLE BIOPSY PROSTATE (TRNBP), *TRANSPERINEAL APPROACH;  Surgeon: Azael Doan MD;  Location[facility];  Service: Urology    WISDOM TOOTH EXTRACTION     :    Medications, Allergies:     Current Outpatient Medications:     ERTAPENEM SODIUM IV, Inject 1 g into a catheter in a vein in the morning (Patient not taking: Reported on 12/4/2024), Disp: , Rfl:     fluocinonide-emollient (LIDEX-E) 0.05 % cream, Apply topically 2 (two) times a day, Disp: 30 g, Rfl: 3    meclizine (ANTIVERT) 12.5 MG tablet, TAKE TWO TABLETS BY MOUTH THREE TIMES A DAY FOR DIZZINESS, Disp: , Rfl:     multivitamin (THERAGRAN) TABS, Take 1 tablet by mouth daily, Disp: , Rfl:     other medication, see sig,, Take by mouth daily Medication/product name:   Erika Strength:   500mg Sig (include dose, route, frequency):, Disp: , Rfl:     phenazopyridine (PYRIDIUM) 95 MG tablet, Take 1 tablet (95 mg total) by mouth 3 (three) times a day as needed for bladder spasms, Disp: 10 tablet, Rfl: 0    traMADol (ULTRAM) 50 mg tablet, Take 1 tablet (50 mg total) by mouth every 6 (six) hours  as needed for moderate pain for up to 5 doses, Disp: 5 tablet, Rfl: 0    Allergies:  No Known Allergies:    Social and Family History:   Social History:   Social History     Tobacco Use    Smoking status: Never     Passive exposure: Past    Smokeless tobacco: Never   Vaping Use    Vaping status: Never Used   Substance Use Topics    Alcohol use: Never    Drug use: Never   .    Social History     Tobacco Use   Smoking Status Never    Passive exposure: Past   Smokeless Tobacco Never       Family History:  Family History   Problem Relation Age of Onset    Prostate cancer Father         2009    Diabetes Mother     Hypertension Mother    :     Review of Systems:     General: Fever, chills, or night sweats: negative  Cardiac: Negative for chest pain.    Pulmonary: Negative for shortness of breath.  Gastrointestinal: Abdominal pain negative.  Nausea, vomiting, or diarrhea negative,  Genitourinary: See HPI above.  Patient does not have hematuria.  All other systems queried were negative.    Physical Exam:   General: Patient is pleasant and in NAD. Awake and alert  There were no vitals taken for this visit.  Cardiac: regular rate  Peripheral edema: negative  Pulmonary: Non-labored breathing lungs clear bilaterally  Abdomen: Soft, non-tender, non-distended.  No surgical scars.  No masses, tenderness, hernias noted.    Genitourinary: Negative CVA tenderness, negative suprapubic tenderness.      Labs:     Lab Results   Component Value Date    HGB 13.4 04/09/2025    HCT 41.7 04/09/2025    WBC 3.79 (L) 04/09/2025     04/09/2025   ]    Lab Results   Component Value Date    K 3.7 04/09/2025     04/09/2025    CO2 26 04/09/2025    BUN 12 04/09/2025    CREATININE 1.12 04/09/2025    CALCIUM 9.6 04/09/2025   ]    Imaging:   I personally reviewed the images and report of the following studies, and reviewed them with the patient:  none    ASSESSMENT:   #1.  Prostate cancer    PLAN:   - Robotic assisted laparoscopic  prostatectomy to proceed as scheduled  - Consent was signed previously  - Blood work and urine culture have been completed  - Reviewed and answered all questions preperi-and postop care including possible outcomes and follow-up    Pavel Enriquez PA-C

## 2025-04-22 ENCOUNTER — ANESTHESIA EVENT (OUTPATIENT)
Dept: PERIOP | Facility: HOSPITAL | Age: 65
End: 2025-04-22
Payer: COMMERCIAL

## 2025-04-23 ENCOUNTER — ANESTHESIA (OUTPATIENT)
Dept: PERIOP | Facility: HOSPITAL | Age: 65
End: 2025-04-23
Payer: COMMERCIAL

## 2025-04-23 ENCOUNTER — TELEPHONE (OUTPATIENT)
Dept: UROLOGY | Facility: CLINIC | Age: 65
End: 2025-04-23

## 2025-04-23 ENCOUNTER — HOSPITAL ENCOUNTER (OUTPATIENT)
Facility: HOSPITAL | Age: 65
Setting detail: OUTPATIENT SURGERY
Discharge: HOME | End: 2025-04-24
Attending: UROLOGY | Admitting: UROLOGY
Payer: COMMERCIAL

## 2025-04-23 DIAGNOSIS — C61 PROSTATE CANCER (HCC): Primary | ICD-10-CM

## 2025-04-23 LAB
ABO GROUP BLD: NORMAL
RH BLD: POSITIVE

## 2025-04-23 PROCEDURE — NC001 PR NO CHARGE

## 2025-04-23 PROCEDURE — 88344 IMHCHEM/IMCYTCHM EA MLT ANTB: CPT | Performed by: PATHOLOGY

## 2025-04-23 PROCEDURE — 86923 COMPATIBILITY TEST ELECTRIC: CPT

## 2025-04-23 PROCEDURE — 88309 TISSUE EXAM BY PATHOLOGIST: CPT | Performed by: PATHOLOGY

## 2025-04-23 PROCEDURE — 55866 LAPS SURG PRST8ECT RPBIC RAD: CPT

## 2025-04-23 PROCEDURE — NC001 PR NO CHARGE: Performed by: UROLOGY

## 2025-04-23 PROCEDURE — 55866 LAPS SURG PRST8ECT RPBIC RAD: CPT | Performed by: UROLOGY

## 2025-04-23 PROCEDURE — 88304 TISSUE EXAM BY PATHOLOGIST: CPT | Performed by: PATHOLOGY

## 2025-04-23 RX ORDER — ACETAMINOPHEN 325 MG/1
650 TABLET ORAL EVERY 6 HOURS SCHEDULED
Status: DISCONTINUED | OUTPATIENT
Start: 2025-04-23 | End: 2025-04-24 | Stop reason: HOSPADM

## 2025-04-23 RX ORDER — ONDANSETRON 2 MG/ML
INJECTION INTRAMUSCULAR; INTRAVENOUS AS NEEDED
Status: DISCONTINUED | OUTPATIENT
Start: 2025-04-23 | End: 2025-04-23

## 2025-04-23 RX ORDER — GABAPENTIN 300 MG/1
300 CAPSULE ORAL
Status: DISCONTINUED | OUTPATIENT
Start: 2025-04-23 | End: 2025-04-24 | Stop reason: HOSPADM

## 2025-04-23 RX ORDER — HYDROMORPHONE HCL/PF 1 MG/ML
0.5 SYRINGE (ML) INJECTION
Status: DISCONTINUED | OUTPATIENT
Start: 2025-04-23 | End: 2025-04-23

## 2025-04-23 RX ORDER — HYDROMORPHONE HCL/PF 1 MG/ML
SYRINGE (ML) INJECTION AS NEEDED
Status: DISCONTINUED | OUTPATIENT
Start: 2025-04-23 | End: 2025-04-23

## 2025-04-23 RX ORDER — ALBUMIN HUMAN 50 G/1000ML
SOLUTION INTRAVENOUS CONTINUOUS PRN
Status: DISCONTINUED | OUTPATIENT
Start: 2025-04-23 | End: 2025-04-23

## 2025-04-23 RX ORDER — ENOXAPARIN SODIUM 100 MG/ML
40 INJECTION SUBCUTANEOUS DAILY
Status: DISCONTINUED | OUTPATIENT
Start: 2025-04-24 | End: 2025-04-24 | Stop reason: HOSPADM

## 2025-04-23 RX ORDER — CEFAZOLIN SODIUM 2 G/50ML
2000 SOLUTION INTRAVENOUS ONCE
Status: COMPLETED | OUTPATIENT
Start: 2025-04-23 | End: 2025-04-23

## 2025-04-23 RX ORDER — ASPIRIN 325 MG
325 TABLET ORAL DAILY
COMMUNITY
End: 2025-04-24

## 2025-04-23 RX ORDER — DOCUSATE SODIUM 100 MG/1
100 CAPSULE, LIQUID FILLED ORAL 2 TIMES DAILY
Status: DISCONTINUED | OUTPATIENT
Start: 2025-04-23 | End: 2025-04-24 | Stop reason: HOSPADM

## 2025-04-23 RX ORDER — SODIUM CHLORIDE, SODIUM LACTATE, POTASSIUM CHLORIDE, CALCIUM CHLORIDE 600; 310; 30; 20 MG/100ML; MG/100ML; MG/100ML; MG/100ML
INJECTION, SOLUTION INTRAVENOUS CONTINUOUS PRN
Status: DISCONTINUED | OUTPATIENT
Start: 2025-04-23 | End: 2025-04-23

## 2025-04-23 RX ORDER — SODIUM CHLORIDE, SODIUM LACTATE, POTASSIUM CHLORIDE, CALCIUM CHLORIDE 600; 310; 30; 20 MG/100ML; MG/100ML; MG/100ML; MG/100ML
125 INJECTION, SOLUTION INTRAVENOUS CONTINUOUS
Status: DISCONTINUED | OUTPATIENT
Start: 2025-04-23 | End: 2025-04-24

## 2025-04-23 RX ORDER — ACETAMINOPHEN 325 MG/1
650 TABLET ORAL EVERY 4 HOURS PRN
Start: 2025-04-23

## 2025-04-23 RX ORDER — ONDANSETRON 2 MG/ML
4 INJECTION INTRAMUSCULAR; INTRAVENOUS EVERY 6 HOURS PRN
Status: DISCONTINUED | OUTPATIENT
Start: 2025-04-23 | End: 2025-04-24 | Stop reason: HOSPADM

## 2025-04-23 RX ORDER — LIDOCAINE HYDROCHLORIDE 10 MG/ML
INJECTION, SOLUTION EPIDURAL; INFILTRATION; INTRACAUDAL; PERINEURAL AS NEEDED
Status: DISCONTINUED | OUTPATIENT
Start: 2025-04-23 | End: 2025-04-23

## 2025-04-23 RX ORDER — MIDAZOLAM HYDROCHLORIDE 2 MG/2ML
INJECTION, SOLUTION INTRAMUSCULAR; INTRAVENOUS AS NEEDED
Status: DISCONTINUED | OUTPATIENT
Start: 2025-04-23 | End: 2025-04-23

## 2025-04-23 RX ORDER — BETAMETHASONE DIPROPIONATE 0.5 MG/G
CREAM TOPICAL 2 TIMES DAILY
Status: DISCONTINUED | OUTPATIENT
Start: 2025-04-23 | End: 2025-04-24 | Stop reason: HOSPADM

## 2025-04-23 RX ORDER — SENNOSIDES 8.6 MG
1 TABLET ORAL DAILY
Status: DISCONTINUED | OUTPATIENT
Start: 2025-04-24 | End: 2025-04-24 | Stop reason: HOSPADM

## 2025-04-23 RX ORDER — HYDROMORPHONE HCL/PF 1 MG/ML
0.5 SYRINGE (ML) INJECTION EVERY 2 HOUR PRN
Status: DISCONTINUED | OUTPATIENT
Start: 2025-04-23 | End: 2025-04-24 | Stop reason: HOSPADM

## 2025-04-23 RX ORDER — DEXAMETHASONE SODIUM PHOSPHATE 10 MG/ML
INJECTION, SOLUTION INTRAMUSCULAR; INTRAVENOUS AS NEEDED
Status: DISCONTINUED | OUTPATIENT
Start: 2025-04-23 | End: 2025-04-23

## 2025-04-23 RX ORDER — NAPROXEN 500 MG/1
500 TABLET ORAL 2 TIMES DAILY WITH MEALS
Qty: 10 TABLET | Refills: 0 | Status: SHIPPED | OUTPATIENT
Start: 2025-04-23 | End: 2025-04-28

## 2025-04-23 RX ORDER — ONDANSETRON 2 MG/ML
4 INJECTION INTRAMUSCULAR; INTRAVENOUS ONCE AS NEEDED
Status: DISCONTINUED | OUTPATIENT
Start: 2025-04-23 | End: 2025-04-23

## 2025-04-23 RX ORDER — PROPOFOL 10 MG/ML
INJECTION, EMULSION INTRAVENOUS CONTINUOUS PRN
Status: DISCONTINUED | OUTPATIENT
Start: 2025-04-23 | End: 2025-04-23

## 2025-04-23 RX ORDER — PROPOFOL 10 MG/ML
INJECTION, EMULSION INTRAVENOUS AS NEEDED
Status: DISCONTINUED | OUTPATIENT
Start: 2025-04-23 | End: 2025-04-23

## 2025-04-23 RX ORDER — GLYCOPYRROLATE 0.2 MG/ML
INJECTION INTRAMUSCULAR; INTRAVENOUS AS NEEDED
Status: DISCONTINUED | OUTPATIENT
Start: 2025-04-23 | End: 2025-04-23

## 2025-04-23 RX ORDER — DOCUSATE SODIUM 100 MG/1
100 CAPSULE, LIQUID FILLED ORAL 2 TIMES DAILY
Qty: 30 CAPSULE | Refills: 0 | Status: SHIPPED | OUTPATIENT
Start: 2025-04-23 | End: 2025-05-08

## 2025-04-23 RX ORDER — FENTANYL CITRATE/PF 50 MCG/ML
50 SYRINGE (ML) INJECTION
Status: DISCONTINUED | OUTPATIENT
Start: 2025-04-23 | End: 2025-04-23

## 2025-04-23 RX ORDER — KETOROLAC TROMETHAMINE 30 MG/ML
INJECTION, SOLUTION INTRAMUSCULAR; INTRAVENOUS AS NEEDED
Status: DISCONTINUED | OUTPATIENT
Start: 2025-04-23 | End: 2025-04-23

## 2025-04-23 RX ORDER — FENTANYL CITRATE 50 UG/ML
INJECTION, SOLUTION INTRAMUSCULAR; INTRAVENOUS AS NEEDED
Status: DISCONTINUED | OUTPATIENT
Start: 2025-04-23 | End: 2025-04-23

## 2025-04-23 RX ORDER — OXYBUTYNIN CHLORIDE 5 MG/1
5 TABLET ORAL EVERY 6 HOURS PRN
Status: DISCONTINUED | OUTPATIENT
Start: 2025-04-23 | End: 2025-04-24 | Stop reason: HOSPADM

## 2025-04-23 RX ORDER — KETOROLAC TROMETHAMINE 30 MG/ML
30 INJECTION, SOLUTION INTRAMUSCULAR; INTRAVENOUS EVERY 6 HOURS SCHEDULED
Status: DISCONTINUED | OUTPATIENT
Start: 2025-04-23 | End: 2025-04-24 | Stop reason: HOSPADM

## 2025-04-23 RX ORDER — KETAMINE HCL IN NACL, ISO-OSM 100MG/10ML
SYRINGE (ML) INJECTION AS NEEDED
Status: DISCONTINUED | OUTPATIENT
Start: 2025-04-23 | End: 2025-04-23

## 2025-04-23 RX ORDER — ROCURONIUM BROMIDE 10 MG/ML
INJECTION, SOLUTION INTRAVENOUS AS NEEDED
Status: DISCONTINUED | OUTPATIENT
Start: 2025-04-23 | End: 2025-04-23

## 2025-04-23 RX ADMIN — ROCURONIUM 25 MG: 50 INJECTION, SOLUTION INTRAVENOUS at 13:49

## 2025-04-23 RX ADMIN — GLYCOPYRROLATE 0.1 MG: 0.2 INJECTION INTRAMUSCULAR; INTRAVENOUS at 12:17

## 2025-04-23 RX ADMIN — PROPOFOL 50 MCG/KG/MIN: 10 INJECTION, EMULSION INTRAVENOUS at 14:39

## 2025-04-23 RX ADMIN — FENTANYL CITRATE 50 MCG: 50 INJECTION INTRAMUSCULAR; INTRAVENOUS at 12:26

## 2025-04-23 RX ADMIN — DEXAMETHASONE SODIUM PHOSPHATE 10 MG: 10 INJECTION INTRAMUSCULAR; INTRAVENOUS at 11:57

## 2025-04-23 RX ADMIN — FENTANYL CITRATE 50 MCG: 50 INJECTION INTRAMUSCULAR; INTRAVENOUS at 12:38

## 2025-04-23 RX ADMIN — PROPOFOL 20 MG: 10 INJECTION, EMULSION INTRAVENOUS at 15:18

## 2025-04-23 RX ADMIN — SODIUM CHLORIDE, SODIUM LACTATE, POTASSIUM CHLORIDE, AND CALCIUM CHLORIDE: .6; .31; .03; .02 INJECTION, SOLUTION INTRAVENOUS at 14:27

## 2025-04-23 RX ADMIN — SUGAMMADEX 200 MG: 100 INJECTION, SOLUTION INTRAVENOUS at 15:09

## 2025-04-23 RX ADMIN — Medication 10 MG: at 13:50

## 2025-04-23 RX ADMIN — HYDROMORPHONE HYDROCHLORIDE 0.5 MG: 1 INJECTION, SOLUTION INTRAMUSCULAR; INTRAVENOUS; SUBCUTANEOUS at 15:09

## 2025-04-23 RX ADMIN — ROCURONIUM 50 MG: 50 INJECTION, SOLUTION INTRAVENOUS at 11:57

## 2025-04-23 RX ADMIN — PROPOFOL 200 MG: 10 INJECTION, EMULSION INTRAVENOUS at 11:56

## 2025-04-23 RX ADMIN — GABAPENTIN 300 MG: 300 CAPSULE ORAL at 21:15

## 2025-04-23 RX ADMIN — ONDANSETRON 4 MG: 2 INJECTION INTRAMUSCULAR; INTRAVENOUS at 15:09

## 2025-04-23 RX ADMIN — SODIUM CHLORIDE, SODIUM LACTATE, POTASSIUM CHLORIDE, AND CALCIUM CHLORIDE 125 ML/HR: .6; .31; .03; .02 INJECTION, SOLUTION INTRAVENOUS at 21:14

## 2025-04-23 RX ADMIN — BETAMETHASONE DIPROPIONATE: 0.5 CREAM TOPICAL at 21:20

## 2025-04-23 RX ADMIN — MIDAZOLAM 2 MG: 1 INJECTION INTRAMUSCULAR; INTRAVENOUS at 11:51

## 2025-04-23 RX ADMIN — KETOROLAC TROMETHAMINE 15 MG: 30 INJECTION, SOLUTION INTRAMUSCULAR; INTRAVENOUS at 15:09

## 2025-04-23 RX ADMIN — KETOROLAC TROMETHAMINE 30 MG: 30 INJECTION, SOLUTION INTRAMUSCULAR; INTRAVENOUS at 21:15

## 2025-04-23 RX ADMIN — ACETAMINOPHEN 650 MG: 325 TABLET, FILM COATED ORAL at 18:50

## 2025-04-23 RX ADMIN — PROPOFOL 20 MG: 10 INJECTION, EMULSION INTRAVENOUS at 15:13

## 2025-04-23 RX ADMIN — ERTAPENEM SODIUM 1000 MG: 1 INJECTION INTRAMUSCULAR; INTRAVENOUS at 18:49

## 2025-04-23 RX ADMIN — PHENYLEPHRINE HYDROCHLORIDE 50 MCG/MIN: 50 INJECTION INTRAVENOUS at 11:57

## 2025-04-23 RX ADMIN — FENTANYL CITRATE 100 MCG: 50 INJECTION INTRAMUSCULAR; INTRAVENOUS at 11:56

## 2025-04-23 RX ADMIN — DOCUSATE SODIUM 100 MG: 100 CAPSULE, LIQUID FILLED ORAL at 18:50

## 2025-04-23 RX ADMIN — CEFAZOLIN SODIUM 2000 MG: 2 SOLUTION INTRAVENOUS at 12:09

## 2025-04-23 RX ADMIN — PROPOFOL 50 MG: 10 INJECTION, EMULSION INTRAVENOUS at 14:55

## 2025-04-23 RX ADMIN — Medication 30 MG: at 11:56

## 2025-04-23 RX ADMIN — ALBUMIN (HUMAN): 12.5 INJECTION, SOLUTION INTRAVENOUS at 13:07

## 2025-04-23 RX ADMIN — Medication 10 MG: at 13:18

## 2025-04-23 RX ADMIN — SODIUM CHLORIDE, SODIUM LACTATE, POTASSIUM CHLORIDE, AND CALCIUM CHLORIDE: .6; .31; .03; .02 INJECTION, SOLUTION INTRAVENOUS at 11:51

## 2025-04-23 RX ADMIN — ROCURONIUM 25 MG: 50 INJECTION, SOLUTION INTRAVENOUS at 13:04

## 2025-04-23 RX ADMIN — HYDROMORPHONE HYDROCHLORIDE 0.5 MG: 1 INJECTION, SOLUTION INTRAMUSCULAR; INTRAVENOUS; SUBCUTANEOUS at 14:55

## 2025-04-23 RX ADMIN — LIDOCAINE HYDROCHLORIDE 50 MG: 10 INJECTION, SOLUTION EPIDURAL; INFILTRATION; INTRACAUDAL at 11:56

## 2025-04-23 RX ADMIN — PROPOFOL 20 MG: 10 INJECTION, EMULSION INTRAVENOUS at 15:23

## 2025-04-23 RX ADMIN — PROPOFOL 20 MG: 10 INJECTION, EMULSION INTRAVENOUS at 15:27

## 2025-04-23 NOTE — ANESTHESIA POSTPROCEDURE EVALUATION
Post-Op Assessment Note    CV Status:  Stable  Pain Score: 0    Pain management: adequate       Mental Status:  Alert and awake   Hydration Status:  Euvolemic   PONV Controlled:  Controlled   Airway Patency:  Patent and adequate  Two or more mitigation strategies used for obstructive sleep apnea   Post Op Vitals Reviewed: Yes    No anethesia notable event occurred.    Staff: CRNA           Last Filed PACU Vitals:  Vitals Value Taken Time   Temp 97.6 °F (36.4 °C) 04/23/25 1541   Pulse 64 04/23/25 1541   /61 04/23/25 1541   Resp 12 04/23/25 1541   SpO2 100 % 04/23/25 1541

## 2025-04-23 NOTE — OP NOTE
Operative Note     PATIENT:  Rony Johnson (MRN [id number])    DATE OF PROCEDURE:   4/23/2025    PREOPERATIVE DIAGNOSIS:  [place] Prostate cancer    POSTOPERATIVE DIAGNOSIS:  [place] Prostate cancer    OPERATION:  Robotic radical prostatectomy,  SURGEON:  Leo Soto MD    ASSISTANT: Sho BROOKE    No qualified teaching residents were available to assist.  The assistance of a robotically trained physician assistant was required in obtaining access, providing laparoscopic exposure, and managing the bedside equipment during this robotics case    ANESTHESIA:  General    ESTIMATED BLOOD LOSS:  50 mL    COMPLICATIONS:  None    FINDINGS: Prostate was very f adherent posteriorly in the anterior perirectal space particular in the left side of the gland.  This was likely secondary to the patient's prior history of sepsis following a transrectal biopsy.  Exquisite care was taken with sharp dissection to free the gland from the anterior perirectal space and a tentative digital rectal examination was performed after the specimen was freed confirming no injury to the rectum.  Otherwise uncomplicated prostatectomy with a watertight vesico- urethral anastomosis    INDICATIONS:  The patient is a gentleman who has been diagnosed with prostate cancer.  We reviewed the treatment options and the patient elected to proceed with the aforementioned procedure.  The risks, benefits, possible complications and alternatives were discussed with him.  Informed consent was obtained before proceeding.       DESCRIPTION OF PROCEDURE:  After informed consent was obtained, the patient was taken to the operating room and placed in the supine position.  He received 2 gm of Ancef within an hour before the start of procedure.  He had bilateral lower extremity sequential compression devices for DVT prophylaxis.  After anesthesia had been smoothly introduced, the patient was moved into the low lithotomy position.  Great care was taken to  pad all pressure points appropriately.  The patient's abdomen had been clipped in the preoperative area.  He was then prepped with ChloraPrep.  The patient was draped sterilely and an Ioban was placed over the abdomen.  A time-out was performed with everyone in the room in agreement of the procedure to be performed.       20mL of Exparel was instilled at the level of the skin and above the peritoneum for all port sites.  An 8mm midline incision above the umbilicus was made for the camera port.  The anterior abdominal wall was elevated and a Veress needle was placed, and we confirmed we were within the abdominal cavity with a water drop test.  After the abdomen was insufflated to 15mmHg, a 8-mm robotic port was then placed.  We then placed the 30 degree robotic camera to inspect the abdomen.  From our insufflation and trocar placement, there was no visualized injury to the bowel or any vessels.  We then placed the remainder of the trocars under direct vision.  Two 8-mm robotic trocars were placed 10 cm away from the midline trocar in the line of the anterior superior iliac spine.  A lateral 8 millimeter trocar was placed in the patient's left side approximately 8 cm away from the more medial robotic trocar and at the same level as the camera trocar.  On the patient's right side, 12-mm trocars placed laterally and a 5-mm port was placed more medially for the assistant.  The robot was then docked.     I began with a posterior approach.  The peritoneum overlying posterior bladder was elevated.  This was entered using electrocautery.  I mobilized each vas deferens and traced approximately with their transected and used to elevate the remaining posterior structures.  Dissected further using combination of sharp and electrocautery into the ampulla of each vas deferens was reach.  These were then mobilized and divided.  Each seminal vesicle is also identifies mobilized and divided.  I carried out the posterior dissection  sharply towards the apex of the prostate at this point.    The bladder was then dropped until the pubic arch was visualized.  Space of Retzius was then further developed.  The prostate was exposed, and the endopelvic fascia was incised in an athemeral fashion bilaterally.  The puboprostatic ligaments were also released.  The DVC was controlled with absorbable vicryl suture.  There was good hemostasis after this had been done.     The junction between the bladder and the prostate was then identified.  This was taken down to the level of the Matute catheter.   The Matute was then retracted superiorly.  The posterior plate of the bladder neck was then developed.  This dissection was carried out further posteriorly until the seminal vesicles and vas deferens were encountered and brought into the surgical field as previously dissected.  These structures were then retracted anteriorly.    The lateral pedicles were then identified.  These were controlled with targeted bipolar electrocautery.  We further developed Denonvillier’s space posteriorly.    A right partial nerve-sparing and a left partial nerve sparing technique was done.  We then proceeded with dissection laterally until the apices were reached bilaterally.  The apices were then carefully dissected free from surrounding tissue, preserving the normal anatomical structures.    Next, the junction between the apex of the prostate and the urethra were identified.  We incised the dorsal vein duran with electrocautery.  We then incised the periurethral tissue without cautery until the urethral catheter was identified.  We incised the posterior urethra and the rectourethralis.  The prostate and seminal vesicles were then freed.    Due to the patient's overall low/intermediate risk disease status, a pelvic lymphadenectomy was not indicated, and was therefore not performed. .  The final specimen was then placed in the entrapment bag.  I introduced a gloved finger into the  rectum and meticulously inspected the rectal wall, which was free of any injury.  Hemostasis was ensured.  The pressure was lowered and the operative field was irrigated.        The vesicourethral anastomosis was accomplished with a running 3-0 V-loc on a CV-23 needle.   We placed a new urethral catheter and no significant leak was seen when irrigated.   The balloon was inflated with 15 cc volume.  A pressure test was performed to 200mL, and no leak was noted.           After hemostasis, the sponge, needle and instrument counts were deemed correct.  All secondary ports were removed.  We enlarged the periumbilical port and removed the specimen within the entrapment bag.  All sponge, needle, and instrument counts were correct times two.    We closed the fascia with a running #1 Maxon suture. The 12mm assistant port was also closed.  This gave an excellent result.  All secondary port sites were irrigated and the skin was closed with Indermil and dressed with an OpSite dressing.  The patient was taken to the recovery room in excellent condition.

## 2025-04-23 NOTE — H&P
UROLOGY HISTORY AND PHYSICAL     Patient Identifiers: Rony Johnson (MRN [id number])      Date of Service: 4/23/2025        ASSESSMENT:     65 y.o. old male with prostate cancer.  Of note the patient did develop bacteremia following his diagnostic biopsy which was performed at an outside facility.      PLAN:     Robotic prostatectomy      History of Present Illness:     Rony Johnson is a 65 y.o. old with a history of prostate cancer    Past Medical, Past Surgical History:     Past Medical History:   Diagnosis Date    Arthritis     Cholecystolithiasis     Pneumonia    :    Past Surgical History:   Procedure Laterality Date    COLONOSCOPY      KS PROSTATE NEEDLE BIOPSY ANY APPROACH N/A 2/21/2020    Procedure: TRANSRECTAL NEEDLE BIOPSY PROSTATE (TRNBP), *TRANSPERINEAL APPROACH;  Surgeon: Azael Doan MD;  Location: [facility];  Service: Urology    WISDOM TOOTH EXTRACTION     :    Medications, Allergies:     Current Facility-Administered Medications:     bupivacaine liposomal (EXPAREL) 1.3 % injection 20 mL, 20 mL, Infiltration, Once, Leo Soto MD    ceFAZolin (ANCEF) IVPB (premix in dextrose) 2,000 mg 50 mL, 2,000 mg, Intravenous, Once, Leo Soto MD    Allergies:  No Known Allergies:    Social and Family History:   Social History:   Social History     Tobacco Use    Smoking status: Never     Passive exposure: Past    Smokeless tobacco: Never   Vaping Use    Vaping status: Never Used   Substance Use Topics    Alcohol use: Never    Drug use: Never   .    Social History     Tobacco Use   Smoking Status Never    Passive exposure: Past   Smokeless Tobacco Never       Family History:  Family History   Problem Relation Age of Onset    Prostate cancer Father         2009    Diabetes Mother     Hypertension Mother    :     Review of Systems:     General: Fever, chills, or night sweats: negative  Cardiac: Negative for chest pain.    Pulmonary: Negative for shortness of  "breath.  Gastrointestinal: Abdominal pain negative  Nausea, vomiting, or diarrhea negative  Genitourinary: See HPI above.  Patient does nothave hematuria.  All other systems queried were negative.    Physical Exam:   General: Patient is pleasant and in NAD. Awake and alert  /68   Pulse 62   Temp (!) 97.3 °F (36.3 °C) (Temporal)   Resp 18   Ht 5' 11\" (1.803 m)   Wt 93 kg (205 lb)   SpO2 98%   BMI 28.59 kg/m²   HEENT:  Normocephalic atraumatic  Cardiac:  Regular rate and rhythm, Peripheral edema: negative  Pulmonary: Non-labored breathing, CTAB  Abdomen: Soft, non-tender, non-distended.  No surgical scars.  No masses, tenderness, hernias noted.    Genitourinary: negative CVA tenderness, neg suprapubic tenderness.  Extremities: normal movement in all 4       Labs:     Lab Results   Component Value Date    HGB 13.4 04/09/2025    HCT 41.7 04/09/2025    WBC 3.79 (L) 04/09/2025     04/09/2025   ]    Lab Results   Component Value Date    K 3.7 04/09/2025     04/09/2025    CO2 26 04/09/2025    BUN 12 04/09/2025    CREATININE 1.12 04/09/2025    CALCIUM 9.6 04/09/2025   ]    Imaging:   I personally reviewed the images and report of the following studies, and reviewed them with the patient:        Thank you for allowing me to participate in this patients’ care.  Please do not hesitate to call with any additional questions.  Leo Soto MD      "

## 2025-04-23 NOTE — OR NURSING
Contacted patient's sister, Shaji, per request of Dr Soto. Spoke with patient's sister and informed her that surgery was going well and continuing as planned, per Dr. Soto. Family member given opportunity to ask questions.

## 2025-04-23 NOTE — ANESTHESIA PREPROCEDURE EVALUATION
Procedure:  PROSTATECTOMY RADICAL LAPAROSCOPIC  W ROBOTICS (Abdomen)    Relevant Problems   ANESTHESIA (within normal limits)      CARDIO (within normal limits)   (-) Angina at rest (HCC)   (-) Angina of effort (HCC)   (-) Chest pain      ENDO (within normal limits)      GI/HEPATIC (within normal limits)      /RENAL   (+) Prostate cancer (HCC)   (+) Prostatitis      HEMATOLOGY (within normal limits)      MUSCULOSKELETAL (within normal limits)      NEURO/PSYCH (within normal limits)      PULMONARY   (-) Asthma   (-) Shortness of breath        Physical Exam    Airway    Mallampati score: II  TM Distance: >3 FB  Neck ROM: full     Dental       Cardiovascular  Rhythm: regular, Rate: normal, Cardiovascular exam normal    Pulmonary  Pulmonary exam normal Breath sounds clear to auscultation    Other Findings  post-pubertal.      Anesthesia Plan  ASA Score- 1     Anesthesia Type- general with ASA Monitors.         Additional Monitors:     Airway Plan: ETT.    Comment: Rony Johnson is a 65 y.o. male with PMHx significant for prostate ca c/p prostatitis presenting today for robotic prostatectomy.    Plan: GETA, PIVx1-2, standard ASA monitors. Standard PONV ppx.     Anesthesia plan and consent discussed with patient who expressed understanding and agreement. Risks/benefits and alternatives discussed with patient including possible PONV, sore throat, damage to teeth/lips/gums and possibility of rare anesthetic and surgical emergencies. Plan discussed and confirmed with CRNA.     RCRI (non-cardiac surgery)  High-risk surgery (intraperitoneal, intrathoracic, suprainguinal vascular surgery): 1  History of ischemic heart disease: 0  History of congestive heart failure: 0  History of cerebrovascular disease: 0  Pre-operative treatment with insulin: 0  Pre-operative creatinine >2.0 mg/dL: 0    Score: 1    0 = < 1%   1 = 1.3% or less (low risk)  2 = 4%-7% (intermediate risk)  >/=3 = 9%-11% (high risk)         .       Plan  Factors-Exercise tolerance (METS): >4 METS.    Chart reviewed. EKG reviewed. Imaging results reviewed. Existing labs reviewed. Patient summary reviewed.    Patient is not a current smoker.  Patient instructed to abstain from smoking on day of procedure. Patient did not smoke on day of surgery.    Obstructive sleep apnea risk education given perioperatively.        Induction- intravenous.    Postoperative Plan- Plan for postoperative opioid use. Planned trial extubation    Perioperative Resuscitation Plan - Level 1 - Full Code.       Informed Consent- Anesthetic plan and risks discussed with patient.  I personally reviewed this patient with the CRNA. Discussed and agreed on the Anesthesia Plan with the CRNA..      NPO Status:  Vitals Value Taken Time   Date of last liquid 04/23/25 04/23/25 1100   Time of last liquid 0900 04/23/25 1100   Date of last solid 04/22/25 04/23/25 1100   Time of last solid 1400 04/23/25 1100

## 2025-04-24 VITALS
HEART RATE: 60 BPM | SYSTOLIC BLOOD PRESSURE: 127 MMHG | RESPIRATION RATE: 20 BRPM | TEMPERATURE: 98.2 F | WEIGHT: 205 LBS | DIASTOLIC BLOOD PRESSURE: 62 MMHG | OXYGEN SATURATION: 98 % | HEIGHT: 71 IN | BODY MASS INDEX: 28.7 KG/M2

## 2025-04-24 LAB
ANION GAP SERPL CALCULATED.3IONS-SCNC: 7 MMOL/L (ref 4–13)
BUN SERPL-MCNC: 17 MG/DL (ref 5–25)
CALCIUM SERPL-MCNC: 8.8 MG/DL (ref 8.4–10.2)
CHLORIDE SERPL-SCNC: 105 MMOL/L (ref 96–108)
CO2 SERPL-SCNC: 25 MMOL/L (ref 21–32)
CREAT SERPL-MCNC: 1.05 MG/DL (ref 0.6–1.3)
ERYTHROCYTE [DISTWIDTH] IN BLOOD BY AUTOMATED COUNT: 12.9 % (ref 11.6–15.1)
GFR SERPL CREATININE-BSD FRML MDRD: 75 ML/MIN/1.73SQ M
GLUCOSE P FAST SERPL-MCNC: 120 MG/DL (ref 65–99)
GLUCOSE SERPL-MCNC: 120 MG/DL (ref 65–140)
HCT VFR BLD AUTO: 36 % (ref 36.5–49.3)
HGB BLD-MCNC: 11.6 G/DL (ref 12–17)
MCH RBC QN AUTO: 30.1 PG (ref 26.8–34.3)
MCHC RBC AUTO-ENTMCNC: 32.2 G/DL (ref 31.4–37.4)
MCV RBC AUTO: 93 FL (ref 82–98)
PLATELET # BLD AUTO: 230 THOUSANDS/UL (ref 149–390)
PMV BLD AUTO: 10.6 FL (ref 8.9–12.7)
POTASSIUM SERPL-SCNC: 4.2 MMOL/L (ref 3.5–5.3)
RBC # BLD AUTO: 3.86 MILLION/UL (ref 3.88–5.62)
SODIUM SERPL-SCNC: 137 MMOL/L (ref 135–147)
WBC # BLD AUTO: 6.81 THOUSAND/UL (ref 4.31–10.16)

## 2025-04-24 PROCEDURE — 99024 POSTOP FOLLOW-UP VISIT: CPT | Performed by: UROLOGY

## 2025-04-24 PROCEDURE — 85027 COMPLETE CBC AUTOMATED: CPT | Performed by: UROLOGY

## 2025-04-24 PROCEDURE — NC001 PR NO CHARGE

## 2025-04-24 PROCEDURE — 80048 BASIC METABOLIC PNL TOTAL CA: CPT | Performed by: UROLOGY

## 2025-04-24 RX ADMIN — ENOXAPARIN SODIUM 40 MG: 40 INJECTION SUBCUTANEOUS at 09:28

## 2025-04-24 RX ADMIN — BETAMETHASONE DIPROPIONATE: 0.5 CREAM TOPICAL at 09:44

## 2025-04-24 RX ADMIN — KETOROLAC TROMETHAMINE 30 MG: 30 INJECTION, SOLUTION INTRAMUSCULAR; INTRAVENOUS at 11:14

## 2025-04-24 RX ADMIN — ACETAMINOPHEN 650 MG: 325 TABLET, FILM COATED ORAL at 11:14

## 2025-04-24 RX ADMIN — ACETAMINOPHEN 650 MG: 325 TABLET, FILM COATED ORAL at 04:53

## 2025-04-24 RX ADMIN — SENNOSIDES 8.6 MG: 8.6 TABLET, FILM COATED ORAL at 09:28

## 2025-04-24 RX ADMIN — KETOROLAC TROMETHAMINE 30 MG: 30 INJECTION, SOLUTION INTRAMUSCULAR; INTRAVENOUS at 04:53

## 2025-04-24 RX ADMIN — ERTAPENEM SODIUM 1000 MG: 1 INJECTION INTRAMUSCULAR; INTRAVENOUS at 09:35

## 2025-04-24 RX ADMIN — DOCUSATE SODIUM 100 MG: 100 CAPSULE, LIQUID FILLED ORAL at 09:28

## 2025-04-24 NOTE — DISCHARGE SUMMARY
Discharge Summary - Urology   Name: Rony Johnson 65 y.o. male I MRN: [id number]  Unit/Bed#: S -01 I Date of Admission: 4/23/2025   Date of Service: 4/24/2025 I Hospital Day: 0    Discharge Summary - Rony Johnson 65 y.o. male MRN: [id number]    Unit/Bed#: S -01 Encounter: [id number]    Admission Date: 4/23/2025     Discharge Date: 04/24/25    HPI: Rony Johnson is a 65 y.o. male who presented for robotic laparoscopic radical prostatectomy     Procedure(s):  PROSTATECTOMY RADICAL LAPAROSCOPIC  W ROBOTICS  Surgeon(s):  DIGNA Last MD Kaitlyn Powell, PA-C  4/23/2025    Hospital Course: Patient presented for robotic laparoscopic radical prostatectomy.  Operation went as planned.  See op note for more details.  Postop day 1 his labs are stable, vital signs stable.  Urine output over 24 hours over 2300, pink-colored.  No clots.  He tolerated his breakfast and lunch, no nausea or vomiting.  Abdomen soft, nondistended.  He is passing gas and had 1 small bowel movement this a.m., the end of his bowel regimen.  He is up and ambulatory in the afternoon.  He meets discharge requirements on the afternoon of 4/24/2025    Discharge Diagnosis: Prostate cancer (HCC)    Condition at Discharge: good    Discharge Medications:  See after visit summary for reconciled discharge medications provided to patient and family.  Patient was discharged home on home medications with the addition of Tylenol, naproxen, Augmentin, Colace.     Discharge instructions/Information to patient and family:   See after visit summary for written and verbal information which has been provided to patient and family.      Provisions for Follow-Up Care:  See after visit summary for information related to follow-up care and any pertinent home health orders.      Disposition: Home    Planned Readmission: No    Discharge Statement   I spent 10 minutes discharging the patient. This time was spent  on the day of discharge. I had direct contact with the patient on the day of discharge. Additional documentation is required if more than 30 minutes were spent on discharge.     Signature:   Shayna Dow PA-C  Date: 4/24/2025 Time: 2:12 PM

## 2025-04-24 NOTE — DISCHARGE INSTR - AVS FIRST PAGE
Your robotic surgery overall went well.  We will plan to see you back in the clinic to remove your catheter and then shortly after we will see you again to go over the pathology results and evaluate your healing.     Continue to care for your rodriges catheter as instructed by nurses.       Keep your incisions clean and dry.  The glue on your skin will fall off on its own in 3-4 weeks.       Drink plenty of fluids and eat when you are hungry.      You may shower at will, but do not submerge yourself in a bath or hot tub for 2 weeks.      It is normal to have some drainage around your catheter.  You may feel the need to urinate and you may notice some leakage around the catheter during this time.  You need not worry about this, but only worry if the catheter is not draining at all.       You may also notice a creamy yellow brown substance around where the catheter enters the penis, these are normal periurethral secretions and they do not signify infection.       You have been prescribed a bowel regimen.  Compliance with this regimen is important to avoid constipation and straining.     You are encouraged to walk around and climb steps, but you should not do any lifting  of anything heavier than 20 pounds for 6 weeks.       Please call us with any significant questions/concerns.          Robot Assisted Laparoscopic Prostatectomy   WHAT YOU NEED TO KNOW:   Robot-assisted laparoscopic prostatectomy (RALP) is surgery to remove your prostate gland through small incisions in your abdomen. RALP is done with a machine that is controlled by your surgeon. The machine has mechanical arms that use small tools to remove your prostate.         DISCHARGE INSTRUCTIONS:   Call your local emergency number (911 in the [place]) for any of the following:   You have chest pain when you take a deep breath or cough. You cough up blood.     You suddenly feel lightheaded and short of breath.     Call your surgeon or uro-oncologist if:   Your arm or  leg feels warm, tender, and painful. It may look swollen and red.     You have more blood in your urine than you were told to expect, or you pass a blood clot.      You have an upset stomach or are vomiting.      You have painful swelling in your abdomen that does not go away.     You have a fever.     Your pain is getting worse, even with medicine.     You have an incision that is red, swollen, or has pus coming from it.     You are urinating less than usual.     You have trouble urinating or having a bowel movement.     You have questions or concerns about your condition or care.     Medicines:  You may need any of the following:  Prescription pain medicine  may be given. Ask your healthcare provider how to take this medicine safely. Some prescription pain medicines contain acetaminophen. Do not take other medicines that contain acetaminophen without talking to your healthcare provider. Too much acetaminophen may cause liver damage. Prescription pain medicine may cause constipation. Ask your healthcare provider how to prevent or treat constipation.         Take your medicine as directed.  Contact your healthcare provider if you think your medicine is not helping or if you have side effects. Tell him or her if you are allergic to any medicine. Keep a list of the medicines, vitamins, and herbs you take. Include the amounts, and when and why you take them. Bring the list or the pill bottles to follow-up visits. Carry your medicine list with you in case of an emergency.     Surgery area care:  Follow your surgeon's directions on how to care for the area. Ask when you can start showering or bathing. You may need to keep the area covered so it does not get wet.  Matute catheter care:  Keep the bag below your waist. If the bag is too high, urine will flow back into your bladder. This can cause an infection. Do not pull on the catheter. This may cause pain and bleeding, and the catheter may come out. Do not let the  catheter tubing kink or twist. A kink or twist will block the flow of urine.  Bladder control:  After surgery, you may leak urine and have trouble controlling when you urinate. The following can help decrease or manage urine leakage:  Do not have caffeine.  Caffeine can cause problems with bladder control and increase your need to urinate.     Limit liquids.  Drink smaller amounts of liquid throughout the day. Do not drink before bedtime. Ask if you should decrease the amount of liquid you drink each day. This may help you control your bladder.     Wear a pad or adult diapers, if needed.  These may help to absorb leaking urine and decrease odor.     Do pelvic floor muscle exercises.  Pelvic floor muscle exercises, also called Kegels, may help improve your bladder control. These exercises are done by tightening and relaxing your pelvic muscles. Ask how to do pelvic floor muscle exercises, and how often to do them.     Self-care:   Rest as needed.  You may feel like resting more after surgery. Slowly start to do more each day.     Ask when it is okay to return to your normal daily activities.  You may need to wait 4 to 6 weeks after surgery. Your healthcare provider will tell you about the activities you should avoid after your surgery. These may include driving while you are taking pain medicines or until your catheter is removed. You may also be given a weight restriction on lifting objects.     Walk when your healthcare provider says more activity is okay.  Walking is an important activity to help with your recovery after surgery. Walking is a good way to improve your overall health and help you recover sooner. It also helps keep your blood flowing and reduces the risk of blood clots. Other types of exercise can also be an important part of your recovery. Ask about the exercises that are safe for you.          Ask when it is okay to start having sex again.  You may have trouble having an erection. Your erections  may return with time. Medicines and mechanical aids may help. Talk to your healthcare provider about these options.     Follow up with your surgeon or uro-oncologist in 1 week or as directed:  You will need your urinary catheter removed. Tests will show if any cancer remains in your body after surgery. Write down your questions so you remember to ask them during your visits.  © Copyright [employer] 2022 Information is for End User's use only and may not be sold, redistributed or otherwise used for commercial purposes. All illustrations and images included in CareNotes® are the copyrighted property of [employer], [employer]. or [employer]  The above information is an  only. It is not intended as medical advice for individual conditions or treatments. Talk to your doctor, nurse or pharmacist before following any medical regimen to see if it is safe and effective for you.

## 2025-04-24 NOTE — PROGRESS NOTES
"Progress Note - Urology   Name: Rony Johnson 65 y.o. male I MRN: [id number]  Unit/Bed#: S -01 I Date of Admission: 4/23/2025   Date of Service: 4/24/2025 I Hospital Day: 0     Assessment & Plan  Prostate cancer (HCC)  POD1 robotic radical prostatectomy by Dr. Soto  24 urine output 2300ml, stable.  Pink-colored  No leukocytosis  Hemoglobin 11.6, stable  Tolerating normal diet, no nausea or vomiting.  Passing gas.  1 bowel movement this a.m., end of bowel regimen.  Ambulatory in the afternoon  Pain control  Stable for discharge          Subjective:   HPI: Seen at bedside.  Reports he did well overnight, minimal pain.  He tolerated breakfast and lunch, no nausea or vomiting.  He is passing gas and had liquid bowel movement this a.m, the end of his bowel regimen.  He is going to get up and ambulate with his sister in the afternoon.  He has not required any opioid pain medication.  Discussed discharge, tentative this afternoon    Review of Systems   Constitutional:  Negative for chills and fever.   Respiratory:  Negative for cough and shortness of breath.    Cardiovascular:  Negative for chest pain and palpitations.   Gastrointestinal:  Negative for abdominal pain and vomiting.   Genitourinary:  Negative for dysuria and hematuria.   Musculoskeletal:  Negative for arthralgias and back pain.   Skin:  Negative for color change and rash.   Neurological:  Negative for seizures and syncope.   All other systems reviewed and are negative.      Objective:    Vitals: Blood pressure 127/62, pulse 60, temperature 98.2 °F (36.8 °C), resp. rate 20, height 5' 11\" (1.803 m), weight 93 kg (205 lb), SpO2 98%.,Body mass index is 28.59 kg/m².    Physical Exam  Constitutional:       General: He is not in acute distress.     Appearance: Normal appearance. He is normal weight. He is not ill-appearing or toxic-appearing.   HENT:      Head: Normocephalic and atraumatic.      Right Ear: External ear normal.      Left Ear: " External ear normal.      Nose: Nose normal.      Mouth/Throat:      Mouth: Mucous membranes are moist.   Eyes:      General: No scleral icterus.     Conjunctiva/sclera: Conjunctivae normal.   Cardiovascular:      Rate and Rhythm: Normal rate.      Pulses: Normal pulses.   Pulmonary:      Effort: Pulmonary effort is normal.   Abdominal:      Comments: Surgical incisions clean, dry, abdomen soft, nondistended   Neurological:      General: No focal deficit present.      Mental Status: He is alert and oriented to person, place, and time. Mental status is at baseline.   Psychiatric:         Mood and Affect: Mood normal.         Behavior: Behavior normal.         Thought Content: Thought content normal.         Judgment: Judgment normal.       Labs:  Recent Labs     04/24/25  0447   WBC 6.81       Recent Labs     04/24/25  0447   HGB 11.6*     Recent Labs     04/24/25  0447   HCT 36.0*     Recent Labs     04/24/25  0447   CREATININE 1.05         History:    Past Medical History:   Diagnosis Date    Arthritis     Cholecystolithiasis     Pneumonia      Social History     Socioeconomic History    Marital status: /Civil Union     Spouse name: None    Number of children: None    Years of education: None    Highest education level: None   Occupational History    None   Tobacco Use    Smoking status: Never     Passive exposure: Past    Smokeless tobacco: Never   Vaping Use    Vaping status: Never Used   Substance and Sexual Activity    Alcohol use: Never    Drug use: Never    Sexual activity: Not Currently   Other Topics Concern    None   Social History Narrative    None     Social Drivers of Health     Financial Resource Strain: Not on file   Food Insecurity: No Food Insecurity (4/23/2025)    Nursing - Inadequate Food Risk Classification     Worried About Running Out of Food in the Last Year: Never true     Ran Out of Food in the Last Year: Never true     Ran Out of Food in the Last Year: Never true   Transportation  Needs: No Transportation Needs (2025)    Nursing - Transportation Risk Classification     Lack of Transportation: Not on file     Lack of Transportation: No   Physical Activity: Not on file   Stress: Not on file   Social Connections: Unknown (2024)    Received from [facility]    Social FIRE1     How often do you feel lonely or isolated from those around you? (Adult - for ages 18 years and over): Not on file   Intimate Partner Violence: Unknown (2025)    Nursing IPS     Feels Physically and Emotionally Safe: Not on file     Physically Hurt by Someone: Not on file     Humiliated or Emotionally Abused by Someone: Not on file     Physically Hurt by Someone: No     Hurt or Threatened by Someone: No   Housing Stability: Unknown (2025)    Nursing: Inadequate Housing Risk Classification     Has Housing: Not on file     Worried About Losing Housing: Not on file     Unable to Get Utilities: Not on file     Unable to Pay for Housing in the Last Year: No     Has Housin     Past Surgical History:   Procedure Laterality Date    COLONOSCOPY      UT LAPS SURG [id number] RPBIC RAD W/NRV SPARING ROBOT N/A 2025    Procedure: PROSTATECTOMY RADICAL LAPAROSCOPIC  W ROBOTICS;  Surgeon: Leo Soto MD;  Location: [facility];  Service: Urology    UT PROSTATE NEEDLE BIOPSY ANY APPROACH N/A 2020    Procedure: TRANSRECTAL NEEDLE BIOPSY PROSTATE (TRNBP), *TRANSPERINEAL APPROACH;  Surgeon: Azael Doan MD;  [facility]: [facility];  Service: Urology    WISDOM TOOTH EXTRACTION       Family History   Problem Relation Age of Onset    Prostate cancer Father         2009    Diabetes Mother     Hypertension Mother        Shayna Dow PA-C  Date: 2025 Time: 2:08 PM

## 2025-04-24 NOTE — ASSESSMENT & PLAN NOTE
POD1 robotic radical prostatectomy by Dr. Soto  24 urine output 2300ml, stable.  Pink-colored  No leukocytosis  Hemoglobin 11.6, stable  Tolerating normal diet, no nausea or vomiting.  Passing gas.  1 bowel movement this a.m., end of bowel regimen.  Ambulatory in the afternoon  Pain control  Stable for discharge

## 2025-04-25 LAB
ABO GROUP BLD BPU: NORMAL
ABO GROUP BLD BPU: NORMAL
BPU ID: NORMAL
BPU ID: NORMAL
CROSSMATCH: NORMAL
CROSSMATCH: NORMAL
UNIT DISPENSE STATUS: NORMAL
UNIT DISPENSE STATUS: NORMAL
UNIT PRODUCT CODE: NORMAL
UNIT PRODUCT CODE: NORMAL
UNIT PRODUCT VOLUME: 350 ML
UNIT PRODUCT VOLUME: 350 ML
UNIT RH: NORMAL
UNIT RH: NORMAL

## 2025-04-25 NOTE — TELEPHONE ENCOUNTER
Post Op Note    Rony Johnson is a 65 y.o. male s/p PROSTATECTOMY RADICAL LAPAROSCOPIC  W ROBOTICS (Abdomen)  performed 4/23/25.  Rony Johnson is a patient of Dr. Soto and is seen at the [place] office.     How would you rate your pain on a scale from 1 to 10, 10 being the worst pain ever? 2 well managed with tylenol and naproxen   Have you had a fever? No  Have your bowel movements been regular? No is having some looser stools. Advised on management  If the patient has an incision- do you have any redness around the incision or any drainage from the incision? No  If the patient has a rodriges- are you comfortable caring for your rodriges? Yes Is it draining urine? Yes    Do you have any other questions or concerns that I can address at this time?   -Went over potential/expected post op symptoms   -discussed ER precautions and s/s of infection  -Confirmed post op appts and locations   -Went over medication   -Went over post op restrictions  -encouraged daily movement, discussed incentive spirometer  -Patient reports no further questions

## 2025-04-29 PROCEDURE — 88344 IMHCHEM/IMCYTCHM EA MLT ANTB: CPT | Performed by: PATHOLOGY

## 2025-04-29 PROCEDURE — 88309 TISSUE EXAM BY PATHOLOGIST: CPT | Performed by: PATHOLOGY

## 2025-04-29 PROCEDURE — 88304 TISSUE EXAM BY PATHOLOGIST: CPT | Performed by: PATHOLOGY

## 2025-05-04 NOTE — PROGRESS NOTES
Name: Rony Johnson      : 1960      MRN: [id number]  Encounter Provider: Leo Soto MD  Encounter Date: 2025   Encounter department: [facility] UROLOGY [place]  :  Assessment & Plan  Prostate cancer (HCC)  1.  Prostate cancer with progression on active surveillance  -Originally diagnosed   -Recent prostate biopsy  -Grade group 2, [place] 3+4 equal to disease in 5 cores, multiple additional positive cores of Vinicius 7  -Current PSA 5.2  -Patient underwent a staging PET scan for unclear indications, I have access to the hardcopy images but not the report  -Last MRI was in 2024: No radiographic detectable disease, 36 g gland  - RALP 25: pT2 Grade group 2, 6-10% prostate volume, negative EPE, negative margins     2.  Recent episode of bacte[facility]  Orders:    PSA Total, Diagnostic; Future          His recovery has been uneventful and he appears to be doing well at this time.   Today we discussed the patient's pathology report from surgery and the implications for short and long-term cancer control were discussed. We discussed expected post-prostatectomy urinary incontinence and erectile dysfunction. We encouraged the patient to initiate Kegel exercises to promote return of bladder control. We reviewed penile rehabilitation strategies, including the use of Oral PDE5 inhibitors and vacuum erection device, which will be further addressed at the patient's follow up visit. We discussed potential side effects associated with these interventions.      History of Present Illness   Rony Johnson is a 65 y.o. male who presents stop status post recent prostatectomy    Review of Systems   Constitutional:  Negative for activity change and fatigue.   HENT:  Negative for congestion.    Eyes:  Negative for visual disturbance.   Respiratory:  Negative for shortness of breath and wheezing.    Cardiovascular:  Negative for chest  pain and leg swelling.   Gastrointestinal:  Negative for abdominal pain.   Endocrine: Negative for polyuria.   Genitourinary:  Negative for dysuria, flank pain, hematuria and urgency.   Musculoskeletal:  Negative for back pain.   Allergic/Immunologic: Negative for immunocompromised state.   Neurological:  Negative for dizziness and numbness.   Psychiatric/Behavioral:  Negative for dysphoric mood.    All other systems reviewed and are negative.         Objective   There were no vitals taken for this visit.    Physical Exam  Constitutional:       General: He is not in acute distress.     Appearance: He is well-developed.   HENT:      Head: Normocephalic and atraumatic.   Pulmonary:      Effort: Pulmonary effort is normal.      Breath sounds: Normal breath sounds.   Abdominal:      Palpations: Abdomen is soft.   Musculoskeletal:         General: Normal range of motion.      Cervical back: Normal range of motion.   Skin:     General: Skin is warm.   Neurological:      Mental Status: He is alert and oriented to person, place, and time.   Psychiatric:         Behavior: Behavior normal.          Results   Lab Results   Component Value Date    PSA 5.256 (H) 11/15/2024    PSA 6.1 (H) 09/21/2020     Lab Results   Component Value Date    CALCIUM 8.8 04/24/2025    K 4.2 04/24/2025    CO2 25 04/24/2025     04/24/2025    BUN 17 04/24/2025    CREATININE 1.05 04/24/2025     Lab Results   Component Value Date    WBC 6.81 04/24/2025    HGB 11.6 (L) 04/24/2025    HCT 36.0 (L) 04/24/2025    MCV 93 04/24/2025     04/24/2025       Office Urine Dip  No results found for this or any previous visit (from the past hour).

## 2025-05-05 ENCOUNTER — PROCEDURE VISIT (OUTPATIENT)
Dept: UROLOGY | Facility: CLINIC | Age: 65
End: 2025-05-05

## 2025-05-05 VITALS
SYSTOLIC BLOOD PRESSURE: 134 MMHG | BODY MASS INDEX: 28.73 KG/M2 | TEMPERATURE: 97.5 F | OXYGEN SATURATION: 98 % | HEART RATE: 56 BPM | WEIGHT: 206 LBS | DIASTOLIC BLOOD PRESSURE: 82 MMHG

## 2025-05-05 DIAGNOSIS — C61 PROSTATE CANCER (HCC): Primary | ICD-10-CM

## 2025-05-05 PROCEDURE — 99024 POSTOP FOLLOW-UP VISIT: CPT

## 2025-05-06 ENCOUNTER — OFFICE VISIT (OUTPATIENT)
Dept: UROLOGY | Facility: CLINIC | Age: 65
End: 2025-05-06

## 2025-05-06 VITALS
BODY MASS INDEX: 28.7 KG/M2 | WEIGHT: 205 LBS | DIASTOLIC BLOOD PRESSURE: 78 MMHG | OXYGEN SATURATION: 98 % | SYSTOLIC BLOOD PRESSURE: 120 MMHG | HEART RATE: 59 BPM | HEIGHT: 71 IN

## 2025-05-06 DIAGNOSIS — C61 PROSTATE CANCER (HCC): Primary | ICD-10-CM

## 2025-05-06 PROCEDURE — 99024 POSTOP FOLLOW-UP VISIT: CPT | Performed by: UROLOGY

## 2025-05-06 RX ORDER — LORAZEPAM 0.5 MG/1
1 TABLET ORAL
COMMUNITY
Start: 2025-04-11

## 2025-05-06 NOTE — PROGRESS NOTES
5/5/2025    Rony Johnson is a 65 y.o. male  [id number]    Diagnosis:  Chief Complaint    Post-op         Patient presents for rodriges cath removal s/p RALP on 4/23/25 with Dr. Soto    Plan:  Rodriges cath will be removed in office  Patient has follow up appointment with  on 5/6/25    Procedure:  This nurse removed rodriges cath by deflating 10 ml balloon. Cath tip noted to be intact, no trauma noted to insertion site. Patient tolerated procedure well and encouraged to keep genital area clean and dry. Patient is encouraged to maintain adequate hydration, perform timed voiding, and refrain from ingesting bladder irritants. Patient verbalizes understanding to all teachings given at this time.     Vitals:    05/05/25 0835   BP: 134/82   Patient Position: Sitting   Cuff Size: Standard   Pulse: 56   Temp: 97.5 °F (36.4 °C)   TempSrc: Temporal   SpO2: 98%   Weight: 93.4 kg (206 lb)         Yvette Abbott LPN

## 2025-06-03 ENCOUNTER — APPOINTMENT (OUTPATIENT)
Dept: LAB | Facility: HOSPITAL | Age: 65
End: 2025-06-03
Attending: UROLOGY
Payer: COMMERCIAL

## 2025-06-03 DIAGNOSIS — C61 PROSTATE CANCER (HCC): ICD-10-CM

## 2025-06-03 LAB — PSA SERPL-MCNC: 0.02 NG/ML (ref 0–4)

## 2025-06-03 PROCEDURE — 36415 COLL VENOUS BLD VENIPUNCTURE: CPT

## 2025-06-03 PROCEDURE — 84153 ASSAY OF PSA TOTAL: CPT

## 2025-06-04 ENCOUNTER — RESULTS FOLLOW-UP (OUTPATIENT)
Dept: UROLOGY | Facility: CLINIC | Age: 65
End: 2025-06-04

## 2025-07-03 NOTE — ASSESSMENT & PLAN NOTE
- Prostate cancer with progression on active surveillance   - Originally diagnosed 2015   - [facility] - -Grade group 2, [place] 3+4 equal to disease in 5 cores, multiple additional positive cores of Vinicius 7   - Pretreatment PSA 5.2   -Patient underwent a staging PET scan for unclear indications, I have access to the hardcopy images but not the report  -Last MRI was in April 2024: No radiographic detectable disease, 36 g gland  - S/p RALP with Dr. Soto on 4/23/25: pT2 Grade group 2, 6-10% prostate volume, negative EPE, negative margins  - PSA from 6/3/25 was 0.019  - Will start Cialis 10 mg three times weekly for penile rehabilitation   - Continue kegels exercises for ANNA MARIE and will start pelvic floor PT.   - Follow up in 3 months with repeat PSA  Orders:    tadalafil (CIALIS) 10 MG tablet; Take 1 tablet (10 mg total) by mouth 3 (three) times a week    PSA Total, Diagnostic; Future

## 2025-07-03 NOTE — PROGRESS NOTES
Name: Rony Johnson      : 1960      MRN: [id number]  Encounter Provider: Sarah Schnitzler, PA-C  Encounter Date: 2025   Encounter department: [facility] UROLOGY [place]  :  Assessment & Plan  Prostate cancer (HCC)  - Prostate cancer with progression on active surveillance   - Originally diagnosed    - [facility] - -Grade group 2, [place] 3+4 equal to disease in 5 cores, multiple additional positive cores of Vinicius 7   - Pretreatment PSA 5.2   -Patient underwent a staging PET scan for unclear indications, I have access to the hardcopy images but not the report  -Last MRI was in 2024: No radiographic detectable disease, 36 g gland  - S/p RALP with Dr. Soto on 25: pT2 Grade group 2, 6-10% prostate volume, negative EPE, negative margins  - PSA from 6/3/25 was 0.019  - Will start Cialis 10 mg three times weekly for penile rehabilitation   - Continue kegels exercises for ANNA MARIE and will start pelvic floor PT.   - Follow up in 3 months with repeat PSA  Orders:    tadalafil (CIALIS) 10 MG tablet; Take 1 tablet (10 mg total) by mouth 3 (three) times a week    PSA Total, Diagnostic; Future    ANNA MARIE (stress urinary incontinence), male    Orders:    Ambulatory Referral to Physical Therapy; Future        History of Present Illness   Rony Johnson is a 65 y.o. male who presents for follow up of prostate cancer s/p RALP on 25. He continues to have bothersome ANNA MARIE and some pelvic discomfort with kegel exercises.     Review of Systems   Constitutional:  Negative for chills and fever.   Respiratory:  Negative for shortness of breath.    Cardiovascular:  Negative for chest pain.   Gastrointestinal:  Negative for abdominal pain.   Genitourinary:  Negative for difficulty urinating, dysuria, flank pain, frequency, hematuria and urgency.   Neurological:  Negative for dizziness.          Objective   There were no vitals taken for  this visit.    Physical Exam  Constitutional:       Appearance: Normal appearance.   HENT:      Head: Normocephalic and atraumatic.      Right Ear: External ear normal.      Left Ear: External ear normal.      Nose: Nose normal.     Eyes:      General: No scleral icterus.     Conjunctiva/sclera: Conjunctivae normal.       Cardiovascular:      Pulses: Normal pulses.   Pulmonary:      Effort: Pulmonary effort is normal.     Musculoskeletal:         General: Normal range of motion.      Cervical back: Normal range of motion.     Neurological:      General: No focal deficit present.      Mental Status: He is alert and oriented to person, place, and time.     Psychiatric:         Mood and Affect: Mood normal.         Behavior: Behavior normal.         Thought Content: Thought content normal.         Judgment: Judgment normal.          Results   Lab Results   Component Value Date    PSA 0.019 06/03/2025    PSA 5.256 (H) 11/15/2024    PSA 6.1 (H) 09/21/2020     Lab Results   Component Value Date    CALCIUM 8.8 04/24/2025    K 4.2 04/24/2025    CO2 25 04/24/2025     04/24/2025    BUN 17 04/24/2025    CREATININE 1.05 04/24/2025     Lab Results   Component Value Date    WBC 6.81 04/24/2025    HGB 11.6 (L) 04/24/2025    HCT 36.0 (L) 04/24/2025    MCV 93 04/24/2025     04/24/2025       Office Urine Dip  No results found for this or any previous visit (from the past hour).

## 2025-07-08 ENCOUNTER — OFFICE VISIT (OUTPATIENT)
Dept: UROLOGY | Facility: CLINIC | Age: 65
End: 2025-07-08

## 2025-07-08 VITALS
TEMPERATURE: 97.5 F | BODY MASS INDEX: 28.28 KG/M2 | HEIGHT: 71 IN | DIASTOLIC BLOOD PRESSURE: 84 MMHG | OXYGEN SATURATION: 97 % | SYSTOLIC BLOOD PRESSURE: 138 MMHG | HEART RATE: 59 BPM | WEIGHT: 202 LBS

## 2025-07-08 DIAGNOSIS — N39.3 SUI (STRESS URINARY INCONTINENCE), MALE: ICD-10-CM

## 2025-07-08 DIAGNOSIS — C61 PROSTATE CANCER (HCC): Primary | ICD-10-CM

## 2025-07-08 PROCEDURE — 99024 POSTOP FOLLOW-UP VISIT: CPT | Performed by: PHYSICIAN ASSISTANT

## 2025-07-08 RX ORDER — TADALAFIL 10 MG/1
10 TABLET ORAL 3 TIMES WEEKLY
Qty: 30 TABLET | Refills: 2 | Status: SHIPPED | OUTPATIENT
Start: 2025-07-09

## (undated) DEVICE — CATH FOLEY COUNCIL 20FR 5ML 2 WAY LUBRICATH

## (undated) DEVICE — TISSUE RETRIEVAL SYSTEM: Brand: INZII RETRIEVAL SYSTEM

## (undated) DEVICE — SURGICEL 4 X 8IN

## (undated) DEVICE — NEEDLE 23G X 1 1/2 SAFETY-GLIDE THIN WALL

## (undated) DEVICE — BAG URINE DRAINAGE URIMETER 350ML LF

## (undated) DEVICE — TIP COVER ACCESSORY

## (undated) DEVICE — DRAPE SHEET THREE QUARTER

## (undated) DEVICE — EXOFIN PRECISION PEN HIGH VISCOSITY TOPICAL SKIN ADHESIVE: Brand: EXOFIN PRECISION PEN, 1G

## (undated) DEVICE — KIT, BETHLEHEM ROBOTIC PROST: Brand: CARDINAL HEALTH

## (undated) DEVICE — ASTOUND STANDARD SURGICAL GOWN, XL: Brand: CONVERTORS

## (undated) DEVICE — 1820 FOAM BLOCK NEEDLE COUNTER: Brand: DEVON

## (undated) DEVICE — SUT PDS II 0 CT-1 27 IN Z340H

## (undated) DEVICE — COLUMN DRAPE

## (undated) DEVICE — SUT VLOC 90 3-0 90 CV-23 L9 IN VLOCM1944

## (undated) DEVICE — MONOPOLAR CURVED SCISSORS: Brand: ENDOWRIST

## (undated) DEVICE — STERILE LUBRICATING JELLY, TUBE: Brand: HR LUBRICATING JELLY

## (undated) DEVICE — TELFA NON-ADHERENT ABSORBENT DRESSING: Brand: TELFA

## (undated) DEVICE — GLOVE SRG BIOGEL 8

## (undated) DEVICE — TROCAR PORT ACCESS 12 X120MM W/BLDLS OPTICAL TIP AIRSEAL

## (undated) DEVICE — SUT VLOC 90 3-0 CV-23 9 IN VLOCM0844

## (undated) DEVICE — SKIN MARKER DUAL TIP WITH RULER CAP, FLEXIBLE RULER AND LABELS: Brand: DEVON

## (undated) DEVICE — DRAPE PROBE NEO-PROBE/ULTRASOUND

## (undated) DEVICE — INTENDED FOR TISSUE SEPARATION, AND OTHER PROCEDURES THAT REQUIRE A SHARP SURGICAL BLADE TO PUNCTURE OR CUT.: Brand: BARD-PARKER SAFETY BLADES SIZE 11, STERILE

## (undated) DEVICE — LARGE NEEDLE DRIVER: Brand: ENDOWRIST

## (undated) DEVICE — GAUZE SPONGES,16 PLY: Brand: CURITY

## (undated) DEVICE — DECANTER: Brand: UNBRANDED

## (undated) DEVICE — JACKSON-PRATT 100CC BULB RESERVOIR: Brand: CARDINAL HEALTH

## (undated) DEVICE — Device: Brand: OMNICLOSE TROCAR SITE CLOSURE DEVICE

## (undated) DEVICE — GLOVE SRG BIOGEL 7

## (undated) DEVICE — NEEDLE SPINAL 22G X 7IN QUINCKE

## (undated) DEVICE — AIRSEAL TUBE SMOKE EVAC LUMENX3 FILTERED

## (undated) DEVICE — KERLIX BANDAGE ROLL: Brand: KERLIX

## (undated) DEVICE — LUBRICANT SURGILUBE TUBE 4 OZ  FLIP TOP

## (undated) DEVICE — SUT VICRYL 0 UR-6 27 IN J603H

## (undated) DEVICE — ARM DRAPE

## (undated) DEVICE — SUT VICRYL 0 CT-1 36 IN J946H

## (undated) DEVICE — INSUFFLATION NEEDLE TO ESTABLISH PNEUMOPERITONEUM.: Brand: INSUFFLATION NEEDLE

## (undated) DEVICE — TROCAR: Brand: KII SLEEVE

## (undated) DEVICE — CATH FOLEY 18FR 5ML 2WAY LUBRICATH

## (undated) DEVICE — PROGRASP FORCEPS: Brand: ENDOWRIST

## (undated) DEVICE — MARYLAND BIPOLAR FORCEPS: Brand: ENDOWRIST

## (undated) DEVICE — SEAL

## (undated) DEVICE — GLOVE INDICATOR PI UNDERGLOVE SZ 7 BLUE

## (undated) DEVICE — MAX-CORE® DISPOSABLE CORE BIOPSY INSTRUMENT, 18G X 25CM: Brand: MAX-CORE

## (undated) DEVICE — MAX-CORE® DISPOSABLE CORE BIOPSY INSTRUMENT, 18G X 20CM: Brand: MAX-CORE

## (undated) DEVICE — JP CHANNEL DRAIN 19FR, FULL FLUTES: Brand: JACKSON-PRATT

## (undated) DEVICE — VISUALIZATION SYSTEM: Brand: CLEARIFY

## (undated) DEVICE — CHLORAPREP HI-LITE 26ML ORANGE

## (undated) DEVICE — 40595 XL TRENDELENBURG POSITIONING KIT: Brand: 40595 XL TRENDELENBURG POSITIONING KIT

## (undated) DEVICE — BLADELESS OBTURATOR: Brand: WECK VISTA

## (undated) DEVICE — SUT MONOCRYL 4-0 PS-2 27 IN Y426H